# Patient Record
Sex: FEMALE | Race: WHITE | Employment: FULL TIME | URBAN - METROPOLITAN AREA
[De-identification: names, ages, dates, MRNs, and addresses within clinical notes are randomized per-mention and may not be internally consistent; named-entity substitution may affect disease eponyms.]

---
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
## 2018-01-16 ENCOUNTER — HOSPITAL ENCOUNTER (EMERGENCY)
Facility: CLINIC | Age: 18
Discharge: HOME | End: 2018-01-16
Attending: NURSE PRACTITIONER | Admitting: NURSE PRACTITIONER
Payer: COMMERCIAL

## 2018-01-16 VITALS
BODY MASS INDEX: 40.59 KG/M2 | HEIGHT: 61 IN | HEART RATE: 106 BPM | DIASTOLIC BLOOD PRESSURE: 73 MMHG | SYSTOLIC BLOOD PRESSURE: 114 MMHG | OXYGEN SATURATION: 97 % | RESPIRATION RATE: 18 BRPM | TEMPERATURE: 99 F | WEIGHT: 215 LBS

## 2018-01-16 DIAGNOSIS — J11.1 INFLUENZA-LIKE ILLNESS: Primary | ICD-10-CM

## 2018-01-16 PROCEDURE — G0463 HOSPITAL OUTPT CLINIC VISIT: HCPCS

## 2018-01-16 PROCEDURE — 99213 OFFICE O/P EST LOW 20 MIN: CPT | Performed by: NURSE PRACTITIONER

## 2018-01-16 RX ORDER — OSELTAMIVIR PHOSPHATE 75 MG/1
75 CAPSULE ORAL 2 TIMES DAILY
Qty: 10 CAPSULE | Refills: 0 | Status: SHIPPED | OUTPATIENT
Start: 2018-01-16 | End: 2018-01-21

## 2018-01-16 RX ORDER — ALBUTEROL SULFATE 90 UG/1
2 AEROSOL, METERED RESPIRATORY (INHALATION) EVERY 6 HOURS PRN
Qty: 1 INHALER | Refills: 0 | Status: SHIPPED | OUTPATIENT
Start: 2018-01-16 | End: 2018-07-31

## 2018-01-16 ASSESSMENT — ENCOUNTER SYMPTOMS
FATIGUE: 1
FEVER: 1
DIAPHORESIS: 1
DIARRHEA: 0
SORE THROAT: 1
VOMITING: 0
RHINORRHEA: 1
CHILLS: 1
ACTIVITY CHANGE: 1
NAUSEA: 0
TROUBLE SWALLOWING: 0
WHEEZING: 0
COUGH: 1
EYE PAIN: 0
HEADACHES: 1
SINUS PRESSURE: 0
SHORTNESS OF BREATH: 1
APPETITE CHANGE: 1
SINUS PAIN: 0
CONSTIPATION: 0

## 2018-01-16 NOTE — ED AVS SNAPSHOT
[facility] Emergency Department    [address] [facility] [address]    Phone:  [phone]    Fax:  [phone]                                       Cami Ware   MRN: [id number]    Department:  [facility] Emergency Department   Date of Visit:  1/16/2018           After Visit Summary Signature Page     I have received my discharge instructions, and my questions have been answered. I have discussed any challenges I see with this plan with the nurse or doctor.    ..........................................................................................................................................  Patient/Patient Representative Signature      ..........................................................................................................................................  Patient Representative Print Name and Relationship to Patient    ..................................................               ................................................  Date                                            Time    ..........................................................................................................................................  Reviewed by Signature/Title    ...................................................              ..............................................  Date                                                            Time

## 2018-01-16 NOTE — ED PROVIDER NOTES
History     Chief Complaint   Patient presents with     URI     cough  congestion    body aches  coughing constantly  wheezing        HPI    Cami Ware is a 17 year old female who presents to the ED with a 2 day history of sore throat, congestion, post nasal drip, sneezing, ear pressure, ear pain, sinus pressure, non productive cough, achiness and generalized body aches, fever of up to 103, chest tightness, shortness of breath, wheezing.  She has tried ibuprofen with improvement of symptoms.  She denies chest pain, decreased urine output, dental pain, diarrhea and vomiting.  She has been exposed to ill contacts at work. Predisposing factors include ill contact: Work, tobacco use and HX of asthma.      Problem List:    Patient Active Problem List    Diagnosis Date Noted     History of termination of pregnancy **DOES NOT WANT INFORMATION DISCLOSED TO ANYONE** 11/15/2017     Priority: Medium     Constipation, unspecified constipation type 08/30/2017     Priority: Medium     Migraine without aura and without status migrainosus, not intractable 11/17/2015     Priority: Medium     History of chicken pox 10/09/2015     Priority: Medium     Patient had chicken pox as a child          Past Medical History:    Past Medical History:   Diagnosis Date     Varicella without complication 1999       Past Surgical History:    No past surgical history on file.    Family History:    Family History   Problem Relation Age of Onset     Thyroid Disease Mother      Graves     Hypertension Mother      CANCER Mother 37     skin cancer      CANCER Brother      Wilm's Tumor, passed       Social History:  Marital Status:  Single [1]  Social History   Substance Use Topics     Smoking status: Current Every Day Smoker     Packs/day: 0.50     Smokeless tobacco: Never Used     Alcohol use No        Medications:      oseltamivir (TAMIFLU) 75 MG capsule   albuterol (PROAIR HFA/PROVENTIL HFA/VENTOLIN HFA) 108 (90 BASE) MCG/ACT Inhaler  "  SUMAtriptan (IMITREX) 100 MG tablet   SUMAtriptan (IMITREX) 100 MG tablet   levonorgestrel-ethinyl estradiol (AVIANE,ALESSE,LESSINA) 0.1-20 MG-MCG per tablet         Review of Systems   Constitutional: Positive for activity change, appetite change, chills, diaphoresis, fatigue and fever.   HENT: Positive for congestion, ear pain, rhinorrhea and sore throat. Negative for ear discharge, sinus pain, sinus pressure and trouble swallowing.    Eyes: Negative for pain.   Respiratory: Positive for cough and shortness of breath. Negative for wheezing.    Cardiovascular: Negative for chest pain.   Gastrointestinal: Negative for constipation, diarrhea, nausea and vomiting.   Skin: Negative for rash.   Neurological: Positive for headaches.   All other systems reviewed and are negative.      Physical Exam   BP: 114/73  Pulse: 106  Temp: 99  F (37.2  C)  Resp: 18  Height: 154.9 cm (5' 1\")  Weight: 97.5 kg (215 lb)  SpO2: 97 %      Physical Exam  GENERAL APPEARANCE ADULT: alert, oriented, appears ill, cooperative  EYES: PERRL, EOM normal, conjunctiva pink, sclera nonicteric, lids normal  HENT: Mouth and posterior oropharynx normal, moist mucous membranes, Ears and TMs normal , nose no nasal discharge or congestion, clear rhinorrhea, mucosal erythema, mucosal edema  NECK: few small anterior cervical nodes  RESP: lungs clear to auscultation bilaterally  CV: regular rate and rhythm, no murmur, rub or gallop  SKIN: no suspicious lesions or rashes  PSYCHE: mentation appears normal, affect and mood normal    ED Course     ED Course     Procedures    Labs Ordered and Resulted from Time of ED Arrival Up to the Time of Departure from the ED - No data to display    Assessments & Plan (with Medical Decision Making)     I have reviewed the nursing notes.    I have reviewed the findings, diagnosis, plan and need for follow up with the patient.  Medical Decision Making:  Upper respiratory infection symptoms with Abnormal - elevated HR vitals. "  DDx:  bronchitis, pneumonia, Viral URI (ie.. Influenza), sinusitis   CXR is not indicated.  Rapid Strep test is not indicated.     Assessment:  Influenza and Viral upper respiratory illness    Plan:   RX influenza  Tamiflu 75 mg bid x 5 days, albuterol inhaler prn    Reassurance given regarding lack of signs of serious infection.  Discussed home treatment with decongestants, cough suppressants and antipyretics.  Recommend follow up in primary care as needed, or sooner if symptoms persist. Return to the ED with fever, trouble swallowing or breathing, or any other concerns.     Condition on disposition: Stable      Discharge Medication List as of 1/16/2018  5:10 PM      START taking these medications    Details   oseltamivir (TAMIFLU) 75 MG capsule Take 1 capsule (75 mg) by mouth 2 times daily for 5 days, Disp-10 capsule, R-0, E-Prescribe      albuterol (PROAIR HFA/PROVENTIL HFA/VENTOLIN HFA) 108 (90 BASE) MCG/ACT Inhaler Inhale 2 puffs into the lungs every 6 hours as needed for shortness of breath / dyspnea or wheezing, Disp-1 Inhaler, R-0, E-Prescribe             Final diagnoses:   Influenza-like illness       1/16/2018   [facility] EMERGENCY DEPARTMENT     Pamela Ferguson, JESÚS CNP  01/16/18 2710

## 2018-01-16 NOTE — LETTER
[facility] EMERGENCY DEPARTMENT  [address] [facility] [address]  Phone: [phone]  Fax: [phone]    January 16, 2018        Cami Ware  [address] [place] [address]   [facility] [address]          To whom it may concern:    RE: Cami Ware    Patient was seen and treated today at our clinic.  Patient may return to work 01/18/2018 with the following:  No working or lifting restrictions    Please contact me for questions or concerns.      Sincerely,        Pamela MCKEON, CNM, FNP, DNP

## 2018-01-16 NOTE — ED AVS SNAPSHOT
[facility] Emergency Department    [address] [facility] [address]    Phone:  [phone]    Fax:  [phone]                                       Cami Ware   MRN: [id number]    Department:  [facility] Emergency Department   Date of Visit:  1/16/2018           Patient Information     Date Of Birth          2000        Your diagnoses for this visit were:     Influenza-like illness        You were seen by Pamela Ferguson APRN CNP.      Follow-up Information     Follow up with No Ref-Primary, Physician In 1 week.    Why:  As needed, If symptoms worsen      Discharge References/Attachments     (ADULT), INFLUENZA (ENGLISH)      24 Hour Appointment Hotline       To make an appointment at any [facility] clinic, call [phone] ([phone]). If you don't have a family doctor or clinic, we will help you find one. [facility] clinics are conveniently located to serve the needs of you and your family.             Review of your medicines      START taking        Dose / Directions Last dose taken    albuterol 108 (90 BASE) MCG/ACT Inhaler   Commonly known as:  PROAIR HFA/PROVENTIL HFA/VENTOLIN HFA   Dose:  2 puff   Quantity:  1 Inhaler        Inhale 2 puffs into the lungs every 6 hours as needed for shortness of breath / dyspnea or wheezing   Refills:  0        oseltamivir 75 MG capsule   Commonly known as:  TAMIFLU   Dose:  75 mg   Quantity:  10 capsule        Take 1 capsule (75 mg) by mouth 2 times daily for 5 days   Refills:  0          Our records show that you are taking the medicines listed below. If these are incorrect, please call your family doctor or clinic.        Dose / Directions Last dose taken    levonorgestrel-ethinyl estradiol 0.1-20 MG-MCG per tablet   Commonly known as:  AVIANE,ALESSE,LESSINA   Dose:  1 tablet   Quantity:  84 tablet        Take 1 tablet by mouth daily   Refills:  3        * SUMAtriptan 100 MG tablet   Commonly known as:  IMITREX   Dose:  100 mg    Quantity:  12 tablet        Take 1 tablet (100 mg) by mouth at onset of headache for migraine May repeat in 2 hours. Max 2 tablets/24 hours.   Refills:  6        * SUMAtriptan 100 MG tablet   Commonly known as:  IMITREX   Quantity:  12 tablet        TAKE 1 TABLET(100 MG) BY MOUTH AT ONSET OF HEADACHE FOR MIGRAINE. MAY REPEAT IN 2 HOURS. MAX 2 TABLETS/ 24 HOURS   Refills:  0        * Notice:  This list has 2 medication(s) that are the same as other medications prescribed for you. Read the directions carefully, and ask your doctor or other care provider to review them with you.            Prescriptions were sent or printed at these locations (2 Prescriptions)                   [facility] - [place], [place] - [address] [facility] AT [facility] OF [address]   [address] [facility] [address]    Telephone:  [phone]   Fax:  [phone]   Hours:                  E-Prescribed (2 of 2)         oseltamivir (TAMIFLU) 75 MG capsule               albuterol (PROAIR HFA/PROVENTIL HFA/VENTOLIN HFA) 108 (90 BASE) MCG/ACT Inhaler                Orders Needing Specimen Collection     None      Pending Results     No orders found from 1/14/2018 to 1/17/2018.            Pending Culture Results     No orders found from 1/14/2018 to 1/17/2018.            Pending Results Instructions     If you had any lab results that were not finalized at the time of your Discharge, you can call the ED Lab Result RN at [phone]. You will be contacted by this team for any positive Lab results or changes in treatment. The nurses are available 7 days a week from 10A to 6:30P.  You can leave a message 24 hours per day and they will return your call.        Test Results From Your Hospital Stay               Thank you for choosing [facility]       Thank you for choosing [facility] for your care. Our goal is always to provide you with excellent care. Hearing back from our patients is one way we can continue to improve our  services. Please take a few minutes to complete the written survey that you may receive in the mail after you visit with us. Thank you!        [email or web address] Information     [employer] gives you secure access to your electronic health record. If you see a primary care provider, you can also send messages to your care team and make appointments. If you have questions, please call your primary care clinic.  If you do not have a primary care provider, please call [phone] and they will assist you.        Care EveryWhere ID     This is your Care EveryWhere ID. This could be used by other organizations to access your [place] medical records  [id number]        Equal Access to Services     YOVANY CH : Michelle Collins, angie baer, aneudy aguillon. So [facility] [phone].    ATENCIÓN: Si habla español, tiene a mcgee disposición servicios gratuitos de asistencia lingüística. Llame al [phone].    We comply with applicable federal civil rights laws and [place] laws. We do not discriminate on the basis of race, color, national origin, age, disability, sex, sexual orientation, or gender identity.            After Visit Summary       This is your record. Keep this with you and show to your community pharmacist(s) and doctor(s) at your next visit.

## 2018-02-01 DIAGNOSIS — G43.009 MIGRAINE WITHOUT AURA AND WITHOUT STATUS MIGRAINOSUS, NOT INTRACTABLE: ICD-10-CM

## 2018-02-01 NOTE — TELEPHONE ENCOUNTER
Requested Prescriptions   Pending Prescriptions Disp Refills     SUMAtriptan (IMITREX) 100 MG tablet [Pharmacy Med Name: SUMATRIPTAN  Last Written Prescription Date:  12/12/17  Last Fill Quantity: 12,  # refills: 0   Last Office Visit with [facility] provider:  10/27/17   Future Office Visit:      100MG TABLETS] 12 tablet 0     Sig: TAKE 1 TABLET(100 MG) BY MOUTH AT ONSET OF HEADACHE FOR MIGRAINE. MAY REPEAT IN 2 HOURS. MAX 2 TABLETS/ 24 HOURS    There is no refill protocol information for this order

## 2018-02-02 RX ORDER — SUMATRIPTAN 100 MG/1
TABLET, FILM COATED ORAL
Qty: 12 TABLET | Refills: 0 | Status: SHIPPED | OUTPATIENT
Start: 2018-02-02 | End: 2018-08-08

## 2018-02-02 NOTE — TELEPHONE ENCOUNTER
Requested Prescriptions   Pending Prescriptions Disp Refills     SUMAtriptan (IMITREX) 100 MG tablet [Pharmacy Med Name: SUMATRIPTAN 100MG TABLETS] 12 tablet 0     Sig: TAKE 1 TABLET(100 MG) BY MOUTH AT ONSET OF HEADACHE FOR MIGRAINE. MAY REPEAT IN 2 HOURS. MAX 2 TABLETS/ 24 HOURS    There is no refill protocol information for this order        Routing refill request to provider for review/approval because:  Drug not on the [facility] refill protocol     Silvia ELLIS Rn

## 2018-07-31 ENCOUNTER — OFFICE VISIT (OUTPATIENT)
Dept: FAMILY MEDICINE | Facility: CLINIC | Age: 18
End: 2018-07-31
Payer: COMMERCIAL

## 2018-07-31 VITALS
WEIGHT: 219.2 LBS | DIASTOLIC BLOOD PRESSURE: 60 MMHG | HEIGHT: 61 IN | BODY MASS INDEX: 41.39 KG/M2 | TEMPERATURE: 98.8 F | SYSTOLIC BLOOD PRESSURE: 102 MMHG | HEART RATE: 92 BPM

## 2018-07-31 DIAGNOSIS — N91.2 AMENORRHEA: ICD-10-CM

## 2018-07-31 DIAGNOSIS — Z32.01 PREGNANCY TEST POSITIVE: Primary | ICD-10-CM

## 2018-07-31 LAB — BETA HCG QUAL IFA URINE: POSITIVE

## 2018-07-31 PROCEDURE — 84703 CHORIONIC GONADOTROPIN ASSAY: CPT | Performed by: NURSE PRACTITIONER

## 2018-07-31 PROCEDURE — 99213 OFFICE O/P EST LOW 20 MIN: CPT | Performed by: NURSE PRACTITIONER

## 2018-07-31 NOTE — PROGRESS NOTES
"  SUBJECTIVE:   Cami Ware is a 17 year old female who presents to clinic today for the following health issues:      Concern - Confirmation of Pregnancy  Onset: LMP:2018,   Took 2 home pregnancy tests yesterday and the day before and both were Positive   Wasn't preventing and wasn't planning a pregnancy.  Happy about being pregnant.  Father of the baby here with her today and is very excited  She has been taking a pre- vitamin daily.  Nonsmoker.  No alcohol.  Drinking a cup of coffee and energy drinks daily.  H/o prior pregnancy - termination          Problem list and histories reviewed & adjusted, as indicated.  Additional history: as documented      Reviewed and updated as needed this visit by clinical staff  Tobacco  Allergies  Meds  Med Hx  Surg Hx  Fam Hx  Soc Hx      Reviewed and updated as needed this visit by Provider         ROS:  Constitutional, HEENT, cardiovascular, pulmonary, gi and gu systems are negative, except as otherwise noted.    OBJECTIVE:     /60 (BP Location: Right arm, Patient Position: Chair, Cuff Size: Adult Large)  Pulse 92  Temp 98.8  F (37.1  C) (Tympanic)  Ht 5' 1\" (1.549 m)  Wt 219 lb 3.2 oz (99.4 kg)  LMP 2018  BMI 41.42 kg/m2  Body mass index is 41.42 kg/(m^2).  GENERAL: healthy, alert and no distress  PSYCH: mentation appears normal, affect normal/bright    Diagnostic Test Results:  Results for orders placed or performed in visit on 18 (from the past 24 hour(s))   Beta HCG Qual, Urine - [facility] and [place] ([id number])   Result Value Ref Range    Beta HCG Qual IFA Urine Positive (A) NEG^Negative          ASSESSMENT/PLAN:       ICD-10-CM    1. Pregnancy test positive Z32.01    2. Amenorrhea N91.2 Beta HCG Qual, Urine - [facility] [place] ([id number])     Pregnancy confirmed.  Continue daily pre-chris vitamin.  Discussed avoidance of caffeine and alcohol.  Discussed exercise and healthy diet.    Information given to OB clinic.      The risks, " benefits and treatment options of prescribed medications or other treatments have been discussed with the patient. The patient verbalized their understanding and should call or follow up if no improvement or if they develop further problems.    JESÚS Arana [facility]

## 2018-07-31 NOTE — PATIENT INSTRUCTIONS
Thank you for choosing [facility].  You may be receiving a survey in the mail from Chad Temple regarding your visit today.  Please take a few minutes to complete and return the survey to let us know how we are doing.      If you have questions or concerns, please contact us via Reonomy or you can contact your care team at [phone].    Our Clinic hours are:  Monday 6:40 am  to 7:00 pm  Tuesday -Friday 6:40 am to 5:00 pm    The [place] outpatient lab hours are:  Monday - Friday 6:10 am to 4:45 pm  Saturdays 7:00 am to 11:00 am  Appointments are required, call [phone]    If you have clinical questions after hours or would like to schedule an appointment,  call the clinic at [phone].

## 2018-07-31 NOTE — MR AVS SNAPSHOT
After Visit Summary   7/31/2018    Cami Ware    MRN: [id number]           Patient Information     Date Of Birth          2000        Visit Information        Provider Department      7/31/2018 9:20 AM Jihan Jacobsen APRN CNP [facility]        Today's Diagnoses     Pregnancy test positive    -  1    Amenorrhea          Care Instructions          Thank you for choosing [facility].  You may be receiving a survey in the mail from [employer] and [employer] regarding your visit today.  Please take a few minutes to complete and return the survey to let us know how we are doing.      If you have questions or concerns, please contact us via Molecular Templates or you can contact your care team at [phone].    Our Clinic hours are:  Monday 6:40 am  to 7:00 pm  Tuesday -Friday 6:40 am to 5:00 pm    The [place] outpatient lab hours are:  Monday - Friday 6:10 am to 4:45 pm  Saturdays 7:00 am to 11:00 am  Appointments are required, call [phone]    If you have clinical questions after hours or would like to schedule an appointment,  call the clinic at [phone].            Follow-ups after your visit        Who to contact     If you have questions or need follow up information about today's clinic visit or your schedule please contact [facility] directly at [phone].  Normal or non-critical lab and imaging results will be communicated to you by StartForcehart, letter or phone within 4 business days after the clinic has received the results. If you do not hear from us within 7 days, please contact the clinic through StartForcehart or phone. If you have a critical or abnormal lab result, we will notify you by phone as soon as possible.  Submit refill requests through Molecular Templates or call your pharmacy and they will forward the refill request to us. Please allow 3 business days for your refill to be completed.          Additional Information About Your Visit        MyChart Information   "   SocialMeterTV gives you secure access to your electronic health record. If you see a primary care provider, you can also send messages to your care team and make appointments. If you have questions, please call your primary care clinic.  If you do not have a primary care provider, please call [phone] and they will assist you.        Care EveryWhere ID     This is your Care EveryWhere ID. This could be used by other organizations to access your [place] medical records  [id number]        Your Vitals Were     Pulse Temperature Height Last Period BMI (Body Mass Index)       92 98.8  F (37.1  C) (Tympanic) 5' 1\" (1.549 m) 06/30/2018 41.42 kg/m2        Blood Pressure from Last 3 Encounters:   07/31/18 102/60   01/16/18 114/73   11/15/17 117/74    Weight from Last 3 Encounters:   07/31/18 219 lb 3.2 oz (99.4 kg) (99 %)*   01/16/18 215 lb (97.5 kg) (98 %)*   11/15/17 208 lb (94.3 kg) (98 %)*     * Growth percentiles are based on CDC 2-20 Years data.              We Performed the Following     Beta HCG Qual, Urine - [facility] [place] ([id number])        Primary Care Provider Fax #    Physician No Ref-Primary [phone]       No address on file        Equal Access to Services     YOVANY CH : Hadii asha riceo Sojazmyne, waaxda luqadaha, qaybta kaalmaanum whaley, aneudy thomas. So [facility] [phone].    ATENCIÓN: Si habla español, tiene a mcgee disposición servicios gratuitos de asistencia lingüística. Llame al [phone].    We comply with applicable federal civil rights laws and [place] laws. We do not discriminate on the basis of race, color, national origin, age, disability, sex, sexual orientation, or gender identity.            Thank you!     Thank you for choosing [facility]  for your care. Our goal is always to provide you with excellent care. Hearing back from our patients is one way we can continue to improve our services. Please take a few minutes to complete the " written survey that you may receive in the mail after your visit with us. Thank you!             Your Updated Medication List - Protect others around you: Learn how to safely use, store and throw away your medicines at www.disposemymeds.org.          This list is accurate as of 7/31/18 10:21 AM.  Always use your most recent med list.                   Brand Name Dispense Instructions for use Diagnosis    SUMAtriptan 100 MG tablet    IMITREX    12 tablet    TAKE 1 TABLET(100 MG) BY MOUTH AT ONSET OF HEADACHE FOR MIGRAINE. MAY REPEAT IN 2 HOURS. MAX 2 TABLETS/ 24 HOURS    Migraine without aura and without status migrainosus, not intractable

## 2018-08-08 ENCOUNTER — APPOINTMENT (OUTPATIENT)
Dept: OBGYN | Facility: CLINIC | Age: 18
End: 2018-08-08
Payer: COMMERCIAL

## 2018-08-08 ENCOUNTER — PRENATAL OFFICE VISIT (OUTPATIENT)
Dept: OBGYN | Facility: CLINIC | Age: 18
End: 2018-08-08

## 2018-08-08 DIAGNOSIS — Z34.00 PRENATAL CARE, FIRST PREGNANCY: Primary | ICD-10-CM

## 2018-08-08 PROCEDURE — 99207 ZZC NO CHARGE NURSE ONLY: CPT | Performed by: OBSTETRICS & GYNECOLOGY

## 2018-08-08 RX ORDER — PRENATAL VIT/IRON FUM/FOLIC AC 27MG-0.8MG
1 TABLET ORAL DAILY
COMMUNITY

## 2018-08-08 NOTE — MR AVS SNAPSHOT
After Visit Summary   8/8/2018    Cami Ware    MRN: [id number]           Patient Information     Date Of Birth          2000        Visit Information        Provider Department      8/8/2018 6:31 PM Vanna Conde MD [facility]        Today's Diagnoses     Prenatal care, first pregnancy    -  1       Follow-ups after your visit        Your next 10 appointments already scheduled     Aug 21, 2018  1:00 PM CDT   New Prenatal with Vanna Conde MD, [facility] 2   [facility]    [address] [address]   [phone]              Who to contact     If you have questions or need follow up information about today's clinic visit or your schedule please contact [facility] directly at [phone].  Normal or non-critical lab and imaging results will be communicated to you by MyChart, letter or phone within 4 business days after the clinic has received the results. If you do not hear from us within 7 days, please contact the clinic through MyChart or phone. If you have a critical or abnormal lab result, we will notify you by phone as soon as possible.  Submit refill requests through HelioVolt or call your pharmacy and they will forward the refill request to us. Please allow 3 business days for your refill to be completed.          Additional Information About Your Visit        MyChart Information     HelioVolt gives you secure access to your electronic health record. If you see a primary care provider, you can also send messages to your care team and make appointments. If you have questions, please call your primary care clinic.  If you do not have a primary care provider, please call [phone] and they will assist you.        Care EveryWhere ID     This is your Care EveryWhere ID. This could be used by other organizations to access your [place] medical records  [id number]        Your Vitals  Were     Last Period                   06/30/2018            Blood Pressure from Last 3 Encounters:   07/31/18 102/60   01/16/18 114/73   11/15/17 117/74    Weight from Last 3 Encounters:   07/31/18 99.4 kg (219 lb 3.2 oz) (99 %)*   01/16/18 97.5 kg (215 lb) (98 %)*   11/15/17 94.3 kg (208 lb) (98 %)*     * Growth percentiles are based on [facility] 2-20 Years data.              Today, you had the following     No orders found for display       Primary Care Provider Fax #    Physician No Ref-Primary [phone]       No address on file        Equal Access to Services     YOVANY CH : Michelle Collins, angie baer, sandra whaley, aneudy wagner . So [facility] [phone].    ATENCIÓN: Si habla español, tiene a mcgee disposición servicios gratuitos de asistencia lingüística. Llame al [phone].    We comply with applicable federal civil rights laws and [place] laws. We do not discriminate on the basis of race, color, national origin, age, disability, sex, sexual orientation, or gender identity.            Thank you!     Thank you for choosing [facility]  for your care. Our goal is always to provide you with excellent care. Hearing back from our patients is one way we can continue to improve our services. Please take a few minutes to complete the written survey that you may receive in the mail after your visit with us. Thank you!             Your Updated Medication List - Protect others around you: Learn how to safely use, store and throw away your medicines at www.disposemymeds.org.          This list is accurate as of 8/8/18  6:53 PM.  Always use your most recent med list.                   Brand Name Dispense Instructions for use Diagnosis    prenatal multivitamin plus iron 27-0.8 MG Tabs per tablet      Take 1 tablet by mouth daily

## 2018-08-21 ENCOUNTER — PRENATAL OFFICE VISIT (OUTPATIENT)
Dept: OBGYN | Facility: CLINIC | Age: 18
End: 2018-08-21
Payer: COMMERCIAL

## 2018-08-21 VITALS
BODY MASS INDEX: 42.03 KG/M2 | WEIGHT: 222.6 LBS | RESPIRATION RATE: 18 BRPM | HEIGHT: 61 IN | TEMPERATURE: 98.5 F | HEART RATE: 92 BPM | SYSTOLIC BLOOD PRESSURE: 122 MMHG | DIASTOLIC BLOOD PRESSURE: 78 MMHG

## 2018-08-21 DIAGNOSIS — Z34.01 ENCOUNTER FOR PRENATAL CARE IN FIRST TRIMESTER OF FIRST PREGNANCY: Primary | ICD-10-CM

## 2018-08-21 LAB
ALBUMIN UR-MCNC: NEGATIVE MG/DL
APPEARANCE UR: CLEAR
BACTERIA #/AREA URNS HPF: ABNORMAL /HPF
BILIRUB UR QL STRIP: NEGATIVE
COLOR UR AUTO: YELLOW
ERYTHROCYTE [DISTWIDTH] IN BLOOD BY AUTOMATED COUNT: 14.1 % (ref 10–15)
GLUCOSE UR STRIP-MCNC: NEGATIVE MG/DL
HCT VFR BLD AUTO: 35.5 % (ref 35–47)
HGB BLD-MCNC: 11.9 G/DL (ref 11.7–15.7)
HGB UR QL STRIP: NEGATIVE
KETONES UR STRIP-MCNC: ABNORMAL MG/DL
LEUKOCYTE ESTERASE UR QL STRIP: ABNORMAL
MCH RBC QN AUTO: 27.4 PG (ref 26.5–33)
MCHC RBC AUTO-ENTMCNC: 33.5 G/DL (ref 31.5–36.5)
MCV RBC AUTO: 82 FL (ref 78–100)
NITRATE UR QL: NEGATIVE
NON-SQ EPI CELLS #/AREA URNS LPF: ABNORMAL /LPF
PH UR STRIP: 6 PH (ref 5–7)
PLATELET # BLD AUTO: 270 10E9/L (ref 150–450)
RBC # BLD AUTO: 4.34 10E12/L (ref 3.8–5.2)
RBC #/AREA URNS AUTO: ABNORMAL /HPF
SOURCE: ABNORMAL
SP GR UR STRIP: 1.02 (ref 1–1.03)
UROBILINOGEN UR STRIP-ACNC: 0.2 EU/DL (ref 0.2–1)
WBC # BLD AUTO: 10.5 10E9/L (ref 4–11)
WBC #/AREA URNS AUTO: ABNORMAL /HPF

## 2018-08-21 PROCEDURE — 86762 RUBELLA ANTIBODY: CPT | Performed by: OBSTETRICS & GYNECOLOGY

## 2018-08-21 PROCEDURE — 86780 TREPONEMA PALLIDUM: CPT | Performed by: OBSTETRICS & GYNECOLOGY

## 2018-08-21 PROCEDURE — 81001 URINALYSIS AUTO W/SCOPE: CPT | Performed by: OBSTETRICS & GYNECOLOGY

## 2018-08-21 PROCEDURE — 36415 COLL VENOUS BLD VENIPUNCTURE: CPT | Performed by: OBSTETRICS & GYNECOLOGY

## 2018-08-21 PROCEDURE — 86900 BLOOD TYPING SEROLOGIC ABO: CPT | Performed by: OBSTETRICS & GYNECOLOGY

## 2018-08-21 PROCEDURE — 87389 HIV-1 AG W/HIV-1&-2 AB AG IA: CPT | Performed by: OBSTETRICS & GYNECOLOGY

## 2018-08-21 PROCEDURE — 87340 HEPATITIS B SURFACE AG IA: CPT | Performed by: OBSTETRICS & GYNECOLOGY

## 2018-08-21 PROCEDURE — 76817 TRANSVAGINAL US OBSTETRIC: CPT | Performed by: OBSTETRICS & GYNECOLOGY

## 2018-08-21 PROCEDURE — 87591 N.GONORRHOEAE DNA AMP PROB: CPT | Performed by: OBSTETRICS & GYNECOLOGY

## 2018-08-21 PROCEDURE — 87491 CHLMYD TRACH DNA AMP PROBE: CPT | Performed by: OBSTETRICS & GYNECOLOGY

## 2018-08-21 PROCEDURE — 99207 ZZC FIRST OB VISIT: CPT | Performed by: OBSTETRICS & GYNECOLOGY

## 2018-08-21 PROCEDURE — 85027 COMPLETE CBC AUTOMATED: CPT | Performed by: OBSTETRICS & GYNECOLOGY

## 2018-08-21 PROCEDURE — 86901 BLOOD TYPING SEROLOGIC RH(D): CPT | Performed by: OBSTETRICS & GYNECOLOGY

## 2018-08-21 PROCEDURE — 86850 RBC ANTIBODY SCREEN: CPT | Performed by: OBSTETRICS & GYNECOLOGY

## 2018-08-21 PROCEDURE — 87086 URINE CULTURE/COLONY COUNT: CPT | Performed by: OBSTETRICS & GYNECOLOGY

## 2018-08-21 NOTE — NURSING NOTE
"Initial /78 (BP Location: Right arm, Patient Position: Right side, Cuff Size: Adult Large)  Pulse 92  Temp 98.5  F (36.9  C) (Tympanic)  Resp 18  Ht 5' 1\" (1.549 m)  Wt 222 lb 9.6 oz (101 kg)  LMP 06/30/2018  Breastfeeding? No  BMI 42.06 kg/m2 Estimated body mass index is 42.06 kg/(m^2) as calculated from the following:    Height as of this encounter: 5' 1\" (1.549 m).    Weight as of this encounter: 222 lb 9.6 oz (101 kg). .    Yanci Monreal, MANDI    "

## 2018-08-21 NOTE — MR AVS SNAPSHOT
After Visit Summary   8/21/2018    Cami Ware    MRN: [id number]           Patient Information     Date Of Birth          2000        Visit Information        Provider Department      8/21/2018 1:00 PM Vanna Conde MD; [facility] 2 [facility]        Today's Diagnoses     Encounter for prenatal care in first trimester of first pregnancy    -  1       Follow-ups after your visit        Follow-up notes from your care team     Return in about 4 weeks (around 9/18/2018).      Your next 10 appointments already scheduled     Sep 18, 2018  8:30 AM CDT   ESTABLISHED PRENATAL with Vanna Conde MD   [facility]    [address] [address] [phone]              Who to contact     If you have questions or need follow up information about today's clinic visit or your schedule please contact [facility] directly at [phone].  Normal or non-critical lab and imaging results will be communicated to you by IActionablehart, letter or phone within 4 business days after the clinic has received the results. If you do not hear from us within 7 days, please contact the clinic through Kidizent or phone. If you have a critical or abnormal lab result, we will notify you by phone as soon as possible.  Submit refill requests through [employer] or call your pharmacy and they will forward the refill request to us. Please allow 3 business days for your refill to be completed.          Additional Information About Your Visit        MyChart Information     [employer] gives you secure access to your electronic health record. If you see a primary care provider, you can also send messages to your care team and make appointments. If you have questions, please call your primary care clinic.  If you do not have a primary care provider, please call [phone] and they will assist you.        Care EveryWhere ID     This is your  "Care EveryWhere ID. This could be used by other organizations to access your [place] medical records  [id number]        Your Vitals Were     Pulse Temperature Respirations Height Last Period Breastfeeding?    92 98.5  F (36.9  C) (Tympanic) 18 5' 1\" (1.549 m) 06/30/2018 No    BMI (Body Mass Index)                   42.06 kg/m2            Blood Pressure from Last 3 Encounters:   08/21/18 122/78   07/31/18 102/60   01/16/18 114/73    Weight from Last 3 Encounters:   08/21/18 222 lb 9.6 oz (101 kg) (99 %)*   07/31/18 219 lb 3.2 oz (99.4 kg) (99 %)*   01/16/18 215 lb (97.5 kg) (98 %)*     * Growth percentiles are based on [facility] 2-20 Years data.              We Performed the Following     *UA reflex to Microscopic     ABO/Rh type and screen     CBC with platelets     Chlamydia trachomatis PCR     Hepatitis B surface antigen     HIV Antigen Antibody Combo     Neisseria gonorrhoeae PCR     Rubella Antibody IgG Quantitative     Treponema Abs w Reflex to RPR and Titer     Urine Culture Aerobic Bacterial     Urine Microscopic     US OB <14 Weeks w Transvaginal Single        Primary Care Provider Fax #    Physician No Ref-Primary [phone]       No address on file        Equal Access to Services     YOVANY CH : Hadii asha riceo Sojazmyne, waaxda luqadaha, qaybta kaalmada adeegyada, aneudy thomas. So [facility] [phone].    ATENCIÓN: Si habla español, tiene a mcgee disposición servicios gratuitos de asistencia lingüística. Llame al [phone].    We comply with applicable federal civil rights laws and [place] laws. We do not discriminate on the basis of race, color, national origin, age, disability, sex, sexual orientation, or gender identity.            Thank you!     Thank you for choosing [facility]  for your care. Our goal is always to provide you with excellent care. Hearing back from our patients is one way we can continue to improve our services. Please take a few " minutes to complete the written survey that you may receive in the mail after your visit with us. Thank you!             Your Updated Medication List - Protect others around you: Learn how to safely use, store and throw away your medicines at www.disposemymeds.org.          This list is accurate as of 8/21/18 11:59 PM.  Always use your most recent med list.                   Brand Name Dispense Instructions for use Diagnosis    prenatal multivitamin plus iron 27-0.8 MG Tabs per tablet      Take 1 tablet by mouth daily

## 2018-08-22 LAB
ABO + RH BLD: NORMAL
ABO + RH BLD: NORMAL
BACTERIA SPEC CULT: NORMAL
BLD GP AB SCN SERPL QL: NORMAL
BLOOD BANK CMNT PATIENT-IMP: NORMAL
C TRACH DNA SPEC QL NAA+PROBE: NEGATIVE
HBV SURFACE AG SERPL QL IA: NONREACTIVE
HIV 1+2 AB+HIV1 P24 AG SERPL QL IA: NONREACTIVE
Lab: NORMAL
N GONORRHOEA DNA SPEC QL NAA+PROBE: NEGATIVE
RUBV IGG SERPL IA-ACNC: 68 IU/ML
SPECIMEN EXP DATE BLD: NORMAL
SPECIMEN SOURCE: NORMAL
T PALLIDUM AB SER QL: NONREACTIVE

## 2018-08-23 NOTE — PROGRESS NOTES
"Cami is a 17 year old  at 7w3d by LMP here for new ob visit.      H/o TAB x1 earlier this year.  This pregnancy was not exactly planned but they are planning to keep it.  PAUL Cosby accompanies her today.  Feeling fatigued and nauseated, vomits about 1x/day.  occ cramping  No bleeding.  Asthma as a child, no current usage of inhalers.    Obstetric History       T0      L0     SAB0   TAB0   Ectopic0   Multiple0   Live Births0       # Outcome Date GA Lbr Vladimir/2nd Weight Sex Delivery Anes PTL Lv   2 Current            1 AB 18 4w0d    TAB             ROS: Ten point review of systems was reviewed and negative except the above.    Past Medical History:   Diagnosis Date     Asthma     as child and teenager     Varicella without complication     Chickenpox     Past Surgical History:   Procedure Laterality Date     MOUTH SURGERY      wisdom teeth     Patient Active Problem List    Diagnosis Date Noted     Prenatal care, first pregnancy 2018     Priority: Medium     PAUL- Nestor Miller       History of termination of pregnancy **DOES NOT WANT INFORMATION DISCLOSED TO ANYONE** 11/15/2017     Priority: Medium     Constipation, unspecified constipation type 2017     Priority: Medium     Migraine without aura and without status migrainosus, not intractable 2015     Priority: Medium     History of chicken pox 10/09/2015     Priority: Medium     Patient had chicken pox as a child        No Known Allergies    Current Outpatient Prescriptions on File Prior to Visit:  Prenatal Vit-Fe Fumarate-FA (PRENATAL MULTIVITAMIN PLUS IRON) 27-0.8 MG TABS per tablet Take 1 tablet by mouth daily     No current facility-administered medications on file prior to visit.     Physical Exam:   /78 (BP Location: Right arm, Patient Position: Right side, Cuff Size: Adult Large)  Pulse 92  Temp 98.5  F (36.9  C) (Tympanic)  Resp 18  Ht 5' 1\" (1.549 m)  Wt 222 lb 9.6 oz (101 kg)  LMP 2018  " Breastfeeding? No  BMI 42.06 kg/m2    Gen:  no acute distress, comfortable, obese  HENT: No scleral injection or icterus  CV: Regular rate and rhythm, no m/g/r  Resp: Normal work of breathing, no cough  GI: Abdomen soft, non-tender. No masses, organomegaly  Skin: No suspicious lesions or rashes  Psychiatric: mentation appears normal    Cervix: Nulliparous, closed, mobile,  no discharge  Uterus: 7 weeks, Normal shape, position and consistency   Adnexa: Normal  Bony Pelvis: Adequate     Dating ultrasound 2018:  Impression:  Faustin intrauterine pregnancy at 7w2d with EUGENIO 2019  Consistent with LMP dating at 7w3d with EUGENIO 2019   CRL 1.13 cm   bpm    A/P 17 year old  at 7w3d dated by LMP c/w 7w3d US here for NOB visit.  - Discussed physician coverage, tertiary support, diet, exercise, weight gain, schedule of visits, routine and indicated ultrasounds, and childbirth education.   - Options for  testing for chromosomal and birth defects were discussed with the patient including nuchal lucency/blood marker testing in the first trimester and quad screening and/or Level 2 ultrasound in the second trimester. We discussed that these are screening tests and not diagnostic tests and that false positives and negatives are a distinct possibility. We discussed that follow up diagnostic testing would include chorionic villus sampling or amniocentesis depending on gestational age.  - NOB labs ordered  - recommend PNV  - she strongly desires Mirena IUD to be placed immediately postpartum.  Initially she was thinking at the time of the delivery, but revised her request later saying that 2-4 weeks later should be sufficient, but she really doesn't desire another unplanned pregnancy.    - obesity - no other risk factor for GDM, but would still consider early GCT.  Pt doesn't have time for this today.      RTC 4 weeks    Vanna Conde MD, MPH  [facility]

## 2018-09-07 ENCOUNTER — APPOINTMENT (OUTPATIENT)
Dept: ULTRASOUND IMAGING | Facility: CLINIC | Age: 18
End: 2018-09-07
Attending: EMERGENCY MEDICINE
Payer: COMMERCIAL

## 2018-09-07 ENCOUNTER — TELEPHONE (OUTPATIENT)
Dept: OBGYN | Facility: CLINIC | Age: 18
End: 2018-09-07

## 2018-09-07 ENCOUNTER — HOSPITAL ENCOUNTER (EMERGENCY)
Facility: CLINIC | Age: 18
Discharge: HOME | End: 2018-09-07
Attending: EMERGENCY MEDICINE | Admitting: EMERGENCY MEDICINE
Payer: COMMERCIAL

## 2018-09-07 VITALS
SYSTOLIC BLOOD PRESSURE: 119 MMHG | OXYGEN SATURATION: 98 % | DIASTOLIC BLOOD PRESSURE: 88 MMHG | RESPIRATION RATE: 16 BRPM | TEMPERATURE: 98.1 F | BODY MASS INDEX: 41.35 KG/M2 | HEIGHT: 61 IN | WEIGHT: 219 LBS

## 2018-09-07 DIAGNOSIS — M54.50 ACUTE BILATERAL LOW BACK PAIN WITHOUT SCIATICA: ICD-10-CM

## 2018-09-07 DIAGNOSIS — R10.30 LOWER ABDOMINAL PAIN: ICD-10-CM

## 2018-09-07 DIAGNOSIS — Z3A.10 10 WEEKS GESTATION OF PREGNANCY: ICD-10-CM

## 2018-09-07 LAB
ALBUMIN UR-MCNC: NEGATIVE MG/DL
ANION GAP SERPL CALCULATED.3IONS-SCNC: 6 MMOL/L (ref 3–14)
APPEARANCE UR: CLEAR
BASOPHILS # BLD AUTO: 0 10E9/L (ref 0–0.2)
BASOPHILS NFR BLD AUTO: 0.3 %
BILIRUB UR QL STRIP: NEGATIVE
BUN SERPL-MCNC: 7 MG/DL (ref 7–30)
CALCIUM SERPL-MCNC: 8.7 MG/DL (ref 8.5–10.1)
CHLORIDE SERPL-SCNC: 107 MMOL/L (ref 96–110)
CO2 SERPL-SCNC: 23 MMOL/L (ref 20–32)
COLOR UR AUTO: YELLOW
CREAT SERPL-MCNC: 0.63 MG/DL (ref 0.5–1)
DIFFERENTIAL METHOD BLD: ABNORMAL
EOSINOPHIL # BLD AUTO: 0.1 10E9/L (ref 0–0.7)
EOSINOPHIL NFR BLD AUTO: 1 %
ERYTHROCYTE [DISTWIDTH] IN BLOOD BY AUTOMATED COUNT: 13.4 % (ref 10–15)
GFR SERPL CREATININE-BSD FRML MDRD: >90 ML/MIN/1.7M2
GLUCOSE SERPL-MCNC: 87 MG/DL (ref 70–99)
GLUCOSE UR STRIP-MCNC: NEGATIVE MG/DL
HCT VFR BLD AUTO: 36.2 % (ref 35–47)
HGB BLD-MCNC: 11.9 G/DL (ref 11.7–15.7)
HGB UR QL STRIP: NEGATIVE
IMM GRANULOCYTES # BLD: 0.1 10E9/L (ref 0–0.4)
IMM GRANULOCYTES NFR BLD: 0.4 %
KETONES UR STRIP-MCNC: NEGATIVE MG/DL
LEUKOCYTE ESTERASE UR QL STRIP: NEGATIVE
LYMPHOCYTES # BLD AUTO: 3 10E9/L (ref 0.8–5.3)
LYMPHOCYTES NFR BLD AUTO: 23 %
MCH RBC QN AUTO: 27 PG (ref 26.5–33)
MCHC RBC AUTO-ENTMCNC: 32.9 G/DL (ref 31.5–36.5)
MCV RBC AUTO: 82 FL (ref 78–100)
MONOCYTES # BLD AUTO: 0.9 10E9/L (ref 0–1.3)
MONOCYTES NFR BLD AUTO: 7 %
NEUTROPHILS # BLD AUTO: 9 10E9/L (ref 1.6–8.3)
NEUTROPHILS NFR BLD AUTO: 68.3 %
NITRATE UR QL: NEGATIVE
NRBC # BLD AUTO: 0 10*3/UL
NRBC BLD AUTO-RTO: 0 /100
PH UR STRIP: 7 PH (ref 5–7)
PLATELET # BLD AUTO: 268 10E9/L (ref 150–450)
POTASSIUM SERPL-SCNC: 3.5 MMOL/L (ref 3.4–5.3)
RBC # BLD AUTO: 4.4 10E12/L (ref 3.8–5.2)
SODIUM SERPL-SCNC: 136 MMOL/L (ref 133–144)
SOURCE: NORMAL
SP GR UR STRIP: 1.01 (ref 1–1.03)
UROBILINOGEN UR STRIP-MCNC: 0 MG/DL (ref 0–2)
WBC # BLD AUTO: 13.2 10E9/L (ref 4–11)

## 2018-09-07 PROCEDURE — 99285 EMERGENCY DEPT VISIT HI MDM: CPT | Mod: 25 | Performed by: EMERGENCY MEDICINE

## 2018-09-07 PROCEDURE — 25000128 H RX IP 250 OP 636: Performed by: EMERGENCY MEDICINE

## 2018-09-07 PROCEDURE — 81003 URINALYSIS AUTO W/O SCOPE: CPT | Performed by: EMERGENCY MEDICINE

## 2018-09-07 PROCEDURE — 85025 COMPLETE CBC W/AUTO DIFF WBC: CPT | Performed by: EMERGENCY MEDICINE

## 2018-09-07 PROCEDURE — 96360 HYDRATION IV INFUSION INIT: CPT | Performed by: EMERGENCY MEDICINE

## 2018-09-07 PROCEDURE — 80048 BASIC METABOLIC PNL TOTAL CA: CPT | Performed by: EMERGENCY MEDICINE

## 2018-09-07 PROCEDURE — 76770 US EXAM ABDO BACK WALL COMP: CPT

## 2018-09-07 PROCEDURE — 99284 EMERGENCY DEPT VISIT MOD MDM: CPT | Mod: Z6 | Performed by: EMERGENCY MEDICINE

## 2018-09-07 RX ORDER — ALBUTEROL SULFATE 90 UG/1
2 AEROSOL, METERED RESPIRATORY (INHALATION) EVERY 6 HOURS PRN
COMMUNITY

## 2018-09-07 RX ORDER — SUMATRIPTAN 100 MG/1
100 TABLET, FILM COATED ORAL
COMMUNITY

## 2018-09-07 RX ADMIN — SODIUM CHLORIDE 500 ML: 9 INJECTION, SOLUTION INTRAVENOUS at 11:29

## 2018-09-07 ASSESSMENT — ENCOUNTER SYMPTOMS
NECK PAIN: 0
ABDOMINAL PAIN: 1
TROUBLE SWALLOWING: 0
FEVER: 0
LIGHT-HEADEDNESS: 0
CHEST TIGHTNESS: 0
WEAKNESS: 0
NAUSEA: 1
BACK PAIN: 0
HEADACHES: 0
PALPITATIONS: 0
CHILLS: 0
DYSURIA: 0
VOMITING: 0
SHORTNESS OF BREATH: 0
DIZZINESS: 0
CONFUSION: 0

## 2018-09-07 NOTE — TELEPHONE ENCOUNTER
"Reason for call:  Patient reporting a symptom    Symptom or request: about 10 wks pregnant.  Having sharp, stabbing pains in back - wrapping around to stomach.    Duration (how long have symptoms been present): since 4 am    Have you been treated for this before? No    Additional comments: hurts so bad - literally \"I can barely move\"  Did make an appt for 10:30am this morning    Phone Number patient can be reached at:  Home number on file [phone] (home)    Best Time:  any    Can we leave a detailed message on this number:  YES    Call taken on 9/7/2018 at 8:27 AM by Jill Leach    "

## 2018-09-07 NOTE — ED NOTES
Pt lifted a heavy resident at 0415 at work and immediately had pain across low abd into back-this pain comes and goes and low back is tender to touch-pt is 10 weeks pregnant-7 week ob check was normal-no vag bleeding

## 2018-09-07 NOTE — TELEPHONE ENCOUNTER
"Spoke to patient on the phone.    S-(situation): \"unbearable, stabbing back pain.\" patient reports this started this morning at 0400. Patient is a CNA and works overnights. Patient denies fever. Patient reports some dysuria, always has frequency being pregnant. Patient reports \" the pain is so bad, I can hardly move.\" patient denies bleeding or abnormal vaginal discharge.    B-(background):     A-(assessment): back pain    R-(recommendations): ER for further evaluation.     Pt in agreement and reports understanding.    Francine Hammer   Ob/Gyn Clinic  RN      "

## 2018-09-07 NOTE — ED PROVIDER NOTES
History     Chief Complaint   Patient presents with     Abdominal Pain     sharp, stabbing low abdj pain, bilateral  and into back. [id number] weeks. vaginal discharge, no bleeding, grav 2,  AB 1     HPI  Cami Ware is a 17 year old female who presents emergency department complaining of lower abdominal stabbing pain radiating into back.  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  This morning she began having the cramping stabbing lower abdominal pain radiating to her back.  This is patient's second pregnancy first 1 was aborted.  She denies any bleeding or discharge she has frequent urination but that is not out of the ordinary and has no pain with urination.  She denies chest pain shortness of breath has had some nausea but denies any significant nausea at this time.  Really rates her pain a 4 out of 10.      Problem List:    Patient Active Problem List    Diagnosis Date Noted     Prenatal care, first pregnancy 08/08/2018     Priority: Medium     DAVINA Miller       History of termination of pregnancy **DOES NOT WANT INFORMATION DISCLOSED TO ANYONE** 11/15/2017     Priority: Medium     Constipation, unspecified constipation type 08/30/2017     Priority: Medium     Migraine without aura and without status migrainosus, not intractable 11/17/2015     Priority: Medium     History of chicken pox 10/09/2015     Priority: Medium     Patient had chicken pox as a child          Past Medical History:    Past Medical History:   Diagnosis Date     Asthma      Varicella without complication 1999       Past Surgical History:    Past Surgical History:   Procedure Laterality Date     MOUTH SURGERY      wisdom teeth       Family History:    Family History   Problem Relation Age of Onset     Thyroid Disease Mother      Graves     Hypertension Mother      Cancer Mother 37     skin cancer      Cancer Brother 2     Wilm's Tumor, passed  "      Social History:  Marital Status:  Single [1]  Social History   Substance Use Topics     Smoking status: Former Smoker     Packs/day: 0.50     Quit date: 5/31/2018     Smokeless tobacco: Never Used     Alcohol use No        Medications:      Prenatal Vit-Fe Fumarate-FA (PRENATAL MULTIVITAMIN PLUS IRON) 27-0.8 MG TABS per tablet         Review of Systems   Constitutional: Negative for chills and fever.   HENT: Negative for congestion and trouble swallowing.    Respiratory: Negative for chest tightness and shortness of breath.    Cardiovascular: Negative for chest pain, palpitations and leg swelling.   Gastrointestinal: Positive for abdominal pain and nausea. Negative for vomiting.   Genitourinary: Negative for decreased urine volume and dysuria.   Musculoskeletal: Negative for back pain and neck pain.   Skin: Negative for rash.   Neurological: Negative for dizziness, weakness, light-headedness and headaches.   Psychiatric/Behavioral: Negative for confusion.       Physical Exam   BP: 143/71  Heart Rate: 100  Temp: 98.1  F (36.7  C)  Resp: 16  Height: 154.9 cm (5' 1\")  Weight: 99.3 kg (219 lb)  SpO2: 99 %      Physical Exam   Constitutional: She appears well-developed and well-nourished. No distress.   HENT:   Head: Normocephalic.   Mouth/Throat: Oropharynx is clear and moist.   Eyes: Conjunctivae are normal.   Neck: Normal range of motion. Neck supple. No JVD present.   Cardiovascular: Normal rate, regular rhythm, normal heart sounds and intact distal pulses.    No murmur heard.  Pulmonary/Chest: Effort normal and breath sounds normal. She has no wheezes. She has no rales.   Abdominal: Soft. Bowel sounds are normal.   Moderately obese nondistended tender to palpation lower abdomen no guarding no rebound bowel sounds positive.   Musculoskeletal: Normal range of motion.   Back with positive tenderness to palpation of the lumbar spine regions no midline back tenderness pulses sensation symmetrical no calf " tenderness.   Neurological: She is alert. She exhibits normal muscle tone.   Skin: Skin is warm and dry. No rash noted.   Psychiatric: She has a normal mood and affect.   Nursing note and vitals reviewed.      ED Course     ED Course     Procedures               Critical Care time:  none               Results for orders placed or performed during the hospital encounter of 09/07/18   Retroperitoneal US    Narrative    US RENAL COMPLETE 9/7/2018 11:01 AM    HISTORY: Ten weeks pregnant. Abdominal pain.    COMPARISON: None.    FINDINGS: The right kidney measures 12.3 x 4.8 x 5.7 cm with a  cortical thickness of 1.5 cm.  The left kidney measures 12.4 x 5.9 x  5.0cm with a cortical thickness of 1.7 cm. The kidneys are normal in  appearance for the patient's age. There is no hydronephrosis. The  bladder is moderately well distended and shows no gross abnormality.      Impression    IMPRESSION: Normal renal ultrasound for the patient's age.    NOLAN SALTER MD   US OB 1st Trimester W Transvaginal W Doppler    Narrative    OBSTETRIC ULTRASOUND WITH ENDOVAGINAL TRANSDUCER September 7, 2018  11:07 AM     HISTORY: 10 week pregnant. Lower abdominal and pelvic cramping pain.     TECHNIQUE: Endovaginal sonography was added to the transabdominal exam  to better evaluate the uterus and ovaries because of inadequate  bladder fullness.    COMPARISON: None.    FINDINGS: There is a single, live intrauterine pregnancy.  Estimated gestational age by current ultrasound measurement: 9 weeks 6  days +/- 1 week.  Estimated date of delivery based on this ultrasound: 4/6/2019.  Crown-rump length: 3.0 cm.   Cardiac activity: 160 BPM.   Yolk sac: Normal.  Subchorionic hemorrhage: None.    Right ovary: Corpus luteum of pregnancy.  Left ovary: Unremarkable. Color-flow Doppler spectral waveform  analysis shows normal blood flow to both ovaries.  Adnexal mass: None.  Free pelvic fluid: None.      Impression    IMPRESSION:   1. Single live intrauterine  pregnancy, 9 weeks 6 days +/- 1 week  gestational age.  2. No adnexal abnormality to account for pelvic cramping.      JOLEEN ROBLES MD         Medications - No data to display    Assessments & Plan (with Medical Decision Making)records were reviewed. Labs were obtained. UA was unremarkable. White count was 13.2 . Bmp was unremarkable. Us of kindeys and pelvic us was obtained. There was a single live intrauterine pregnancy no adnexal abnormality. No evidence of hydronephrosis. Patient is told of findings and feels comfortable follow up with her ob on Monday. She will return if symptoms worsen or new symptoms develop.      I have reviewed the nursing notes.    I have reviewed the findings, diagnosis, plan and need for follow up with the patient.       Discharge Medication List as of 9/7/2018 12:08 PM          Final diagnoses:   Lower abdominal pain   10 weeks gestation of pregnancy   Acute bilateral low back pain without sciatica       9/7/2018   [facility] EMERGENCY DEPARTMENT     Jose Angel Lilly MD  09/09/18 [id number]

## 2018-09-07 NOTE — ED AVS SNAPSHOT
[facility] Emergency Department    [address] [address]    Phone:  [phone]    Fax:  [phone]                                       Cami Ware   MRN: [id number]    Department:  [facility] Emergency Department   Date of Visit:  9/7/2018           After Visit Summary Signature Page     I have received my discharge instructions, and my questions have been answered. I have discussed any challenges I see with this plan with the nurse or doctor.    ..........................................................................................................................................  Patient/Patient Representative Signature      ..........................................................................................................................................  Patient Representative Print Name and Relationship to Patient    ..................................................               ................................................  Date                                            Time    ..........................................................................................................................................  Reviewed by Signature/Title    ...................................................              ..............................................  Date                                                            Time          22EPIC Rev 08/18

## 2018-09-07 NOTE — DISCHARGE INSTRUCTIONS
Return if symptoms worsen or new symptoms develop.  Follow-up with primary care physician/OB next week for recheck.  Drink plenty of fluids.  Take Tylenol for pain.  If increased abdominal pain back pain vaginal bleeding or discharge occur please return for recheck.    Abdominal Pain and Early Pregnancy    The tests you had show that you are pregnant, but the exact cause of your pain isn t clear.  Some pain and bleeding are common early in pregnancy. Often they stop, and you can go on to have a normal pregnancy and baby. Other times the pain or bleeding can be signs of a miscarriage or ectopic pregnancy. An ectopic pregnancy is a very serious problem. At this time it is unclear if your pregnancy will continue normally, if you will have a miscarriage, or if you could have an ectopic pregnancy. Below is some information about this.  Miscarriage  At this time we don t know whether you will have a miscarriage, or if things will clear up and your pregnancy will continue normally. We understand that this is emotionally difficult. There is little we can say to change the way you feel. But understand that miscarriages are common.  About 1 or 2 out of every 10 pregnancies end this way. Some end even before you know you are pregnant. This happens for a number of reasons, and usually we never figure out why. It s important you know that it is not your fault. It didn t happen because you did anything wrong.  Having sex or exercising does not cause a miscarriage. These activities are usually safe unless you have pain or bleeding or your doctor tells you to stop. Even minor falls won t cause a miscarriage. Miscarriages happen because things were not developing as they were supposed to. No medicine can prevent a miscarriage.  Ectopic pregnancy  In a normal pregnancy, the fertilized egg attaches to the wall of the womb (uterus). In an ectopic or tubal pregnancy, the fertilized egg attaches outside the uterus, usually in the  fallopian tube. Very rarely, the egg attaches to an ovary or somewhere else in the abdomen. An ectopic pregnancy is much less common than a miscarriage, but it is very serious. The baby cannot survive, and as it grows it can rupture the tube. This can cause internal bleeding and even death. Risk factors for an ectopic are:    An ectopic pregnancy in the past    Pelvic inflammatory disease, or PID    Endometriosis    Smoking    An IUD  Additional tests  Because we don t know what s causing your symptoms, you will need more tests to figure out what the problem is. You may need the following.  Ultrasound  An ultrasound can usually find a normal pregnancy as early as 4 to 5 weeks along. If the ultrasound does not show the baby inside the uterus, it means one of the following.    You have a normal pregnancy less than 4 weeks along    You are having or recently had a miscarriage    You have an ectopic pregnancy  Quantitative HCG  This test measures the amount of a pregnancy hormone in your blood. Comparing today's test result to a repeat test in 2 days will show whether you have a normal pregnancy.  Laparoscopy  This is a type of surgery. The healthcare provider will put a tube with a light inside your belly (abdomen) to look directly at your pelvic organs. This test is used when it is not safe to wait 2 days for blood test results.  Important information  If you do have an ectopic pregnancy, there is a small chance that the growing fetus can tear the fallopian tube. This can cause severe internal bleeding. If this happens, you may have:    Sudden severe pain in your lower abdomen    Vaginal bleeding    Weakness, dizziness, and sometimes fainting  If any of these symptoms occur:    Call 911or return right away to the hospital.    Don't drive yourself.    Don't go to your healthcare provider's office or to a clinic. Go to the hospital.   Home care  Follow these guidelines to help care for yourself at home:    Rest until  your next exam. Don t do anything strenuous.    Eat a light diet with foods that are easy to digest.    Don t have sex until your healthcare provider says it s OK.  Follow-up care  Follow up with your healthcare provider, or as advised. If you were told to have a repeat blood test in 2 days, it s important to get it done.  If you had an X-ray or ultrasound, a radiologist will review it. You will be told of any new findings that may affect your care.  Call 911  Call 911 if you have any of these:    Severe pain and very heavy bleeding    Severe lightheadedness, passing out, or fainting    Rapid heart rate    Trouble breathing    Confused or difficulty waking up  When to seek medical advice  Call your healthcare provider right away if any of these occur:    The pain in your abdomen gets worse, either suddenly or gradually.    You are dizzy or weak when you stand.    You have heavy vaginal bleeding. This means soaking 1 pad an hour for 3 hours.    You have vaginal bleeding for more than 5 days.    You have repeated vomiting or diarrhea.    The pain in your abdomen moves to the lower right.    You have blood in your vomit or bowel movements. This will be dark red or black.    You have a fever of 100.4 F (38 C) or higher, or as directed by your healthcare provider.  Date Last Reviewed: 10/1/2016    1569-9799 [employer]. [address] [place] [address], [place] [id number]. All rights reserved. This information is not intended as a substitute for professional medical care. Always follow your healthcare professional's instructions.          Back Pain (Acute or Chronic)    Back pain is one of the most common problems. The good news is that most people feel better in 1 to 2 weeks, and most of the rest in 1 to 2 months. Most people can remain active.  People experience and describe pain differently; not everyone is the same.    The pain can be sharp, stabbing, shooting, aching, cramping or burning.    Movement, standing,  bending, lifting, sitting, or walking may worsen pain.    It can be localized to one spot or area, or it can be more generalized.    It can spread or radiate upwards, to the front, or go down your arms or legs (sciatica).    It can cause muscle spasm.  Most of the time, mechanical problems with the muscles or spine cause the pain. Mechanical problems are usually caused by an injury to the muscles or ligaments. While illness can cause back pain, it is usually not caused by a serious illness. Mechanical problems include:     Physical activity such as sports, exercise, work, or normal activity    Overexertion, lifting, pushing, pulling incorrectly or too aggressively    Sudden twisting, bending, or stretching from an accident, or accidental movement    Poor posture    Stretching or moving wrong, without noticing pain at the time    Poor coordination, lack of regular exercise (check with your doctor about this)    Spinal disc disease or arthritis    Stress  Pain can also be related to pregnancy, or illness like appendicitis, bladder or kidney infections, pelvic infections, and many other things.  Acute back pain usually gets better in 1 to 2 weeks. Back pain related to disk disease, arthritis in the spinal joints or spinal stenosis (narrowing of the spinal canal) can become chronic and last for months or years.  Unless you had a physical injury (for example, a car accident or fall) X-rays are usually not needed for the initial evaluation of back pain. If pain continues and does not respond to medical treatment, X-rays and other tests may be needed.  Home care  Try these home care recommendations:    When in bed, try to find a position of comfort. A firm mattress is best. Try lying flat on your back with pillows under your knees. You can also try lying on your side with your knees bent up towards your chest and a pillow between your knees.    At first, do not try to stretch out the sore spots. If there is a strain, it is  not like the good soreness you get after exercising without an injury. In this case, stretching may make it worse.    Avoid prolong sitting, long car rides, or travel. This puts more stress on the lower back than standing or walking.    During the first 24 to 72 hours after an acute injury or flare up of chronic back pain, apply an ice pack to the painful area for 20 minutes and then remove it for 20 minutes. Do this over a period of 60 to 90 minutes or several times a day. This will reduce swelling and pain. Wrap the ice pack in a thin towel or plastic to protect your skin.    You can start with ice, then switch to heat. Heat (hot shower, hot bath, or heating pad) reduces pain and works well for muscle spasms. Heat can be applied to the painful area for 20 minutes then remove it for 20 minutes. Do this over a period of 60 to 90 minutes or several times a day. Do not sleep on a heating pad. It can lead to skin burns or tissue damage.    You can alternate ice and heat therapy. Talk with your doctor about the best treatment for your back pain.    Therapeutic massage can help relax the back muscles without stretching them.    Be aware of safe lifting methods and do not lift anything without stretching first.  Medicines  Talk to your doctor before using medicine, especially if you have other medical problems or are taking other medicines.    You may use over-the-counter medicine as directed on the bottle to control pain, unless another pain medicine was prescribed. If you have chronic conditions like diabetes, liver or kidney disease, stomach ulcers, or gastrointestinal bleeding, or are taking blood thinners, talk to your doctor before taking any medicine.    Be careful if you are given a prescription medicines, narcotics, or medicine for muscle spasms. They can cause drowsiness, affect your coordination, reflexes, and judgement. Do not drive or operate heavy machinery.  Follow-up care  Follow up with your healthcare  "provider, or as advised.   A radiologist will review any X-rays that were taken. Your provide will notify you of any new findings that may affect your care.  Call 911  Call emergency services if any of the following occur:    Trouble breathing    Confusion    Very drowsy or trouble awakening    Fainting or loss of consciousness    Rapid or very slow heart rate    Loss of bowel or bladder control  When to seek medical advice  Call your healthcare provider right away if any of these occur:     Pain becomes worse or spreads to your legs    Weakness or numbness in one or both legs    Numbness in the groin or genital area  Date Last Reviewed: 7/1/2016 2000-2017 [employer]. [address] [place] [id number]. All rights reserved. This information is not intended as a substitute for professional medical care. Always follow your healthcare professional's instructions.          Pregnancy: Your First Trimester Changes  The first trimester is a time of rapid development for your baby. Because your baby is growing so quickly, it is important that you start a healthy lifestyle right away. By the end of the first trimester, your baby has formed all of its major body organs and weighs just over an ounce.     Actual size of baby is 1/4\"    Month 1 (weeks 1 to 4)  The placenta (the organ that nourishes your baby) begins to form. The brain, spinal cord, heart, gastrointestinal tract, and lungs begin to develop. Your baby is about 1/4-inch long by the end of the first month.     Actual size of baby is 1\"      Month 2 (weeks 5 to 8)  All of your baby s major body organs form. The face, fingers, toes, ears, and eyes appear. By the end of the month, your baby is about 1-inch long.     Actual size of baby is 3\"      Month 3 (weeks 9 to 12)  Your baby can open and close its fists and mouth. The sexual organs begin to form. As the first trimester ends, your baby is about 3-inches long.  Date Last Reviewed: " 10/1/2017    7617-4888 [employer]. [address] [place] [address], [place] [address]. All rights reserved. This information is not intended as a substitute for professional medical care. Always follow your healthcare professional's instructions.

## 2018-09-07 NOTE — ED AVS SNAPSHOT
[facility] Emergency Department    [address] [facility] [address]    Phone:  [phone]    Fax:  [phone]                                       Cami Ware   MRN: [id number]    Department:  [facility] Emergency Department   Date of Visit:  9/7/2018           Patient Information     Date Of Birth          2000        Your diagnoses for this visit were:     Lower abdominal pain     10 weeks gestation of pregnancy     Acute bilateral low back pain without sciatica        You were seen by Jose Angel Lilly MD.      Follow-up Information     Follow up with Milena Jacobsen.    Why:  Next week for recheck        Follow up with [facility] Emergency Department.    Specialty:  EMERGENCY MEDICINE    Why:  If symptoms worsen    Contact information:    [address] [place] [id number] [phone]    Additional information:    The medical center is located at   [address] (between [address] and   [address] in [place], [place] miles north   of [place]).        Discharge Instructions       Return if symptoms worsen or new symptoms develop.  Follow-up with primary care physician/OB next week for recheck.  Drink plenty of fluids.  Take Tylenol for pain.  If increased abdominal pain back pain vaginal bleeding or discharge occur please return for recheck.    Abdominal Pain and Early Pregnancy    The tests you had show that you are pregnant, but the exact cause of your pain isn t clear.  Some pain and bleeding are common early in pregnancy. Often they stop, and you can go on to have a normal pregnancy and baby. Other times the pain or bleeding can be signs of a miscarriage or ectopic pregnancy. An ectopic pregnancy is a very serious problem. At this time it is unclear if your pregnancy will continue normally, if you will have a miscarriage, or if you could have an ectopic pregnancy. Below is some information about this.  Miscarriage  At this time we don t know whether  you will have a miscarriage, or if things will clear up and your pregnancy will continue normally. We understand that this is emotionally difficult. There is little we can say to change the way you feel. But understand that miscarriages are common.  About 1 or 2 out of every 10 pregnancies end this way. Some end even before you know you are pregnant. This happens for a number of reasons, and usually we never figure out why. It s important you know that it is not your fault. It didn t happen because you did anything wrong.  Having sex or exercising does not cause a miscarriage. These activities are usually safe unless you have pain or bleeding or your doctor tells you to stop. Even minor falls won t cause a miscarriage. Miscarriages happen because things were not developing as they were supposed to. No medicine can prevent a miscarriage.  Ectopic pregnancy  In a normal pregnancy, the fertilized egg attaches to the wall of the womb (uterus). In an ectopic or tubal pregnancy, the fertilized egg attaches outside the uterus, usually in the fallopian tube. Very rarely, the egg attaches to an ovary or somewhere else in the abdomen. An ectopic pregnancy is much less common than a miscarriage, but it is very serious. The baby cannot survive, and as it grows it can rupture the tube. This can cause internal bleeding and even death. Risk factors for an ectopic are:    An ectopic pregnancy in the past    Pelvic inflammatory disease, or PID    Endometriosis    Smoking    An IUD  Additional tests  Because we don t know what s causing your symptoms, you will need more tests to figure out what the problem is. You may need the following.  Ultrasound  An ultrasound can usually find a normal pregnancy as early as 4 to 5 weeks along. If the ultrasound does not show the baby inside the uterus, it means one of the following.    You have a normal pregnancy less than 4 weeks along    You are having or recently had a miscarriage    You have  an ectopic pregnancy  Quantitative HCG  This test measures the amount of a pregnancy hormone in your blood. Comparing today's test result to a repeat test in 2 days will show whether you have a normal pregnancy.  Laparoscopy  This is a type of surgery. The healthcare provider will put a tube with a light inside your belly (abdomen) to look directly at your pelvic organs. This test is used when it is not safe to wait 2 days for blood test results.  Important information  If you do have an ectopic pregnancy, there is a small chance that the growing fetus can tear the fallopian tube. This can cause severe internal bleeding. If this happens, you may have:    Sudden severe pain in your lower abdomen    Vaginal bleeding    Weakness, dizziness, and sometimes fainting  If any of these symptoms occur:    Call 911or return right away to the hospital.    Don't drive yourself.    Don't go to your healthcare provider's office or to a clinic. Go to the hospital.   Home care  Follow these guidelines to help care for yourself at home:    Rest until your next exam. Don t do anything strenuous.    Eat a light diet with foods that are easy to digest.    Don t have sex until your healthcare provider says it s OK.  Follow-up care  Follow up with your healthcare provider, or as advised. If you were told to have a repeat blood test in 2 days, it s important to get it done.  If you had an X-ray or ultrasound, a radiologist will review it. You will be told of any new findings that may affect your care.  Call 911  Call 911 if you have any of these:    Severe pain and very heavy bleeding    Severe lightheadedness, passing out, or fainting    Rapid heart rate    Trouble breathing    Confused or difficulty waking up  When to seek medical advice  Call your healthcare provider right away if any of these occur:    The pain in your abdomen gets worse, either suddenly or gradually.    You are dizzy or weak when you stand.    You have heavy vaginal  bleeding. This means soaking 1 pad an hour for 3 hours.    You have vaginal bleeding for more than 5 days.    You have repeated vomiting or diarrhea.    The pain in your abdomen moves to the lower right.    You have blood in your vomit or bowel movements. This will be dark red or black.    You have a fever of 100.4 F (38 C) or higher, or as directed by your healthcare provider.  Date Last Reviewed: 10/1/2016    4525-4649 The [email or web address]. [address] [place] [address]. All rights reserved. This information is not intended as a substitute for professional medical care. Always follow your healthcare professional's instructions.          Back Pain (Acute or Chronic)    Back pain is one of the most common problems. The good news is that most people feel better in 1 to 2 weeks, and most of the rest in 1 to 2 months. Most people can remain active.  People experience and describe pain differently; not everyone is the same.    The pain can be sharp, stabbing, shooting, aching, cramping or burning.    Movement, standing, bending, lifting, sitting, or walking may worsen pain.    It can be localized to one spot or area, or it can be more generalized.    It can spread or radiate upwards, to the front, or go down your arms or legs (sciatica).    It can cause muscle spasm.  Most of the time, mechanical problems with the muscles or spine cause the pain. Mechanical problems are usually caused by an injury to the muscles or ligaments. While illness can cause back pain, it is usually not caused by a serious illness. Mechanical problems include:     Physical activity such as sports, exercise, work, or normal activity    Overexertion, lifting, pushing, pulling incorrectly or too aggressively    Sudden twisting, bending, or stretching from an accident, or accidental movement    Poor posture    Stretching or moving wrong, without noticing pain at the time    Poor coordination, lack of regular exercise (check with  your doctor about this)    Spinal disc disease or arthritis    Stress  Pain can also be related to pregnancy, or illness like appendicitis, bladder or kidney infections, pelvic infections, and many other things.  Acute back pain usually gets better in 1 to 2 weeks. Back pain related to disk disease, arthritis in the spinal joints or spinal stenosis (narrowing of the spinal canal) can become chronic and last for months or years.  Unless you had a physical injury (for example, a car accident or fall) X-rays are usually not needed for the initial evaluation of back pain. If pain continues and does not respond to medical treatment, X-rays and other tests may be needed.  Home care  Try these home care recommendations:    When in bed, try to find a position of comfort. A firm mattress is best. Try lying flat on your back with pillows under your knees. You can also try lying on your side with your knees bent up towards your chest and a pillow between your knees.    At first, do not try to stretch out the sore spots. If there is a strain, it is not like the good soreness you get after exercising without an injury. In this case, stretching may make it worse.    Avoid prolong sitting, long car rides, or travel. This puts more stress on the lower back than standing or walking.    During the first 24 to 72 hours after an acute injury or flare up of chronic back pain, apply an ice pack to the painful area for 20 minutes and then remove it for 20 minutes. Do this over a period of 60 to 90 minutes or several times a day. This will reduce swelling and pain. Wrap the ice pack in a thin towel or plastic to protect your skin.    You can start with ice, then switch to heat. Heat (hot shower, hot bath, or heating pad) reduces pain and works well for muscle spasms. Heat can be applied to the painful area for 20 minutes then remove it for 20 minutes. Do this over a period of 60 to 90 minutes or several times a day. Do not sleep on a  heating pad. It can lead to skin burns or tissue damage.    You can alternate ice and heat therapy. Talk with your doctor about the best treatment for your back pain.    Therapeutic massage can help relax the back muscles without stretching them.    Be aware of safe lifting methods and do not lift anything without stretching first.  Medicines  Talk to your doctor before using medicine, especially if you have other medical problems or are taking other medicines.    You may use over-the-counter medicine as directed on the bottle to control pain, unless another pain medicine was prescribed. If you have chronic conditions like diabetes, liver or kidney disease, stomach ulcers, or gastrointestinal bleeding, or are taking blood thinners, talk to your doctor before taking any medicine.    Be careful if you are given a prescription medicines, narcotics, or medicine for muscle spasms. They can cause drowsiness, affect your coordination, reflexes, and judgement. Do not drive or operate heavy machinery.  Follow-up care  Follow up with your healthcare provider, or as advised.   A radiologist will review any X-rays that were taken. Your provide will notify you of any new findings that may affect your care.  Call 911  Call emergency services if any of the following occur:    Trouble breathing    Confusion    Very drowsy or trouble awakening    Fainting or loss of consciousness    Rapid or very slow heart rate    Loss of bowel or bladder control  When to seek medical advice  Call your healthcare provider right away if any of these occur:     Pain becomes worse or spreads to your legs    Weakness or numbness in one or both legs    Numbness in the groin or genital area  Date Last Reviewed: 7/1/2016 2000-2017 The [employer]. [address] [place] [address], [place] [address]. All rights reserved. This information is not intended as a substitute for professional medical care. Always follow your healthcare professional's  "instructions.          Pregnancy: Your First Trimester Changes  The first trimester is a time of rapid development for your baby. Because your baby is growing so quickly, it is important that you start a healthy lifestyle right away. By the end of the first trimester, your baby has formed all of its major body organs and weighs just over an ounce.     Actual size of baby is 1/4\"    Month 1 (weeks 1 to 4)  The placenta (the organ that nourishes your baby) begins to form. The brain, spinal cord, heart, gastrointestinal tract, and lungs begin to develop. Your baby is about 1/4-inch long by the end of the first month.     Actual size of baby is 1\"      Month 2 (weeks 5 to 8)  All of your baby s major body organs form. The face, fingers, toes, ears, and eyes appear. By the end of the month, your baby is about 1-inch long.     Actual size of baby is 3\"      Month 3 (weeks 9 to 12)  Your baby can open and close its fists and mouth. The sexual organs begin to form. As the first trimester ends, your baby is about 3-inches long.  Date Last Reviewed: 10/1/2017    7771-9133 The [employer]. [address] [place] [address]. All rights reserved. This information is not intended as a substitute for professional medical care. Always follow your healthcare professional's instructions.          Your next 10 appointments already scheduled     Sep 18, 2018  8:30 AM CDT   ESTABLISHED PRENATAL with Vanna Conde MD   [facility] ([facility]    [address] [place] [id number] [phone]              24 Hour Appointment Hotline       To make an appointment at any [facility], call [phone] ([phone]). If you don't have a family doctor or clinic, we will help you find one. [facility] (formerly [facility]) are conveniently located to serve the needs of you and your family.             Review of your medicines      Our records show that you are taking the medicines " listed below. If these are incorrect, please call your family doctor or clinic.        Dose / Directions Last dose taken    albuterol 108 (90 Base) MCG/ACT inhaler   Commonly known as:  PROAIR HFA/PROVENTIL HFA/VENTOLIN HFA   Dose:  2 puff        Inhale 2 puffs into the lungs every 6 hours as needed for shortness of breath / dyspnea or wheezing   Refills:  0        prenatal multivitamin plus iron 27-0.8 MG Tabs per tablet   Dose:  1 tablet        Take 1 tablet by mouth daily   Refills:  0        SUMAtriptan 100 MG tablet   Commonly known as:  IMITREX   Dose:  100 mg        Take 100 mg by mouth at onset of headache for migraine TK 1 T PO AT ONSET OF HEADACHE FOR MIGRAINE. MAY REPEAT IN 2 H. MAX 2 TS IN 24 H   Refills:  0                Procedures and tests performed during your visit     Basic metabolic panel    CBC with platelets, differential    Retroperitoneal US    UA reflex to Microscopic    US OB 1st Trimester W Transvaginal W Doppler      Orders Needing Specimen Collection     None      Pending Results     Date and Time Order Name Status Description    9/7/2018 0922 US OB 1st Trimester W Transvaginal W Doppler Preliminary             Pending Culture Results     No orders found from 9/5/2018 to 9/8/2018.            Pending Results Instructions     If you had any lab results that were not finalized at the time of your Discharge, you can call the ED Lab Result RN at [phone]. You will be contacted by this team for any positive Lab results or changes in treatment. The nurses are available 7 days a week from 10A to 6:30P.  You can leave a message 24 hours per day and they will return your call.        Test Results From Your Hospital Stay        9/7/2018  9:55 AM      Component Results     Component Value Ref Range & Units Status    WBC 13.2 (H) 4.0 - 11.0 10e9/L Final    RBC Count 4.40 3.8 - 5.2 10e12/L Final    Hemoglobin 11.9 11.7 - 15.7 g/dL Final    Hematocrit 36.2 35.0 - 47.0 % Final    MCV 82 78 - 100 fl  Final    MCH 27.0 26.5 - 33.0 pg Final    MCHC 32.9 31.5 - 36.5 g/dL Final    RDW 13.4 10.0 - 15.0 % Final    Platelet Count 268 150 - 450 10e9/L Final    Diff Method Automated Method  Final    % Neutrophils 68.3 % Final    % Lymphocytes 23.0 % Final    % Monocytes 7.0 % Final    % Eosinophils 1.0 % Final    % Basophils 0.3 % Final    % Immature Granulocytes 0.4 % Final    Nucleated RBCs 0 0 /100 Final    Absolute Neutrophil 9.0 (H) 1.6 - 8.3 10e9/L Final    Absolute Lymphocytes 3.0 0.8 - 5.3 10e9/L Final    Absolute Monocytes 0.9 0.0 - 1.3 10e9/L Final    Absolute Eosinophils 0.1 0.0 - 0.7 10e9/L Final    Absolute Basophils 0.0 0.0 - 0.2 10e9/L Final    Abs Immature Granulocytes 0.1 0 - 0.4 10e9/L Final    Absolute Nucleated RBC 0.0  Final         9/7/2018 10:15 AM      Component Results     Component Value Ref Range & Units Status    Sodium 136 133 - 144 mmol/L Final    Potassium 3.5 3.4 - 5.3 mmol/L Final    Chloride 107 96 - 110 mmol/L Final    Carbon Dioxide 23 20 - 32 mmol/L Final    Anion Gap 6 3 - 14 mmol/L Final    Glucose 87 70 - 99 mg/dL Final    Urea Nitrogen 7 7 - 30 mg/dL Final    Creatinine 0.63 0.50 - 1.00 mg/dL Final    GFR Estimate >90 >60 mL/min/1.7m2 Final    Non  GFR Calc    GFR Estimate If Black >90 >60 mL/min/1.7m2 Final    African American GFR Calc    Calcium 8.7 8.5 - 10.1 mg/dL Final         9/7/2018 11:58 AM      Component Results     Component Value Ref Range & Units Status    Color Urine Yellow  Final    Appearance Urine Clear  Final    Glucose Urine Negative NEG^Negative mg/dL Final    Bilirubin Urine Negative NEG^Negative Final    Ketones Urine Negative NEG^Negative mg/dL Final    Specific Gravity Urine 1.012 1.003 - 1.035 Final    Blood Urine Negative NEG^Negative Final    pH Urine 7.0 5.0 - 7.0 pH Final    Protein Albumin Urine Negative NEG^Negative mg/dL Final    Urobilinogen mg/dL 0.0 0.0 - 2.0 mg/dL Final    Nitrite Urine Negative NEG^Negative Final    Leukocyte  Esterase Urine Negative NEG^Negative Final    Source Midstream Urine  Final               9/7/2018 11:05 AM      Narrative     US RENAL COMPLETE 9/7/2018 11:01 AM    HISTORY: Ten weeks pregnant. Abdominal pain.    COMPARISON: None.    FINDINGS: The right kidney measures 12.3 x 4.8 x 5.7 cm with a  cortical thickness of 1.5 cm.  The left kidney measures 12.4 x 5.9 x  5.0cm with a cortical thickness of 1.7 cm. The kidneys are normal in  appearance for the patient's age. There is no hydronephrosis. The  bladder is moderately well distended and shows no gross abnormality.        Impression     IMPRESSION: Normal renal ultrasound for the patient's age.    NOLAN SALTER MD         9/7/2018 11:19 AM      Narrative     OBSTETRIC ULTRASOUND WITH ENDOVAGINAL TRANSDUCER September 7, 2018  11:07 AM     HISTORY: 10 week pregnant. Lower abdominal and pelvic cramping pain.     TECHNIQUE: Endovaginal sonography was added to the transabdominal exam  to better evaluate the uterus and ovaries because of inadequate  bladder fullness.    COMPARISON: None.    FINDINGS: There is a single, live intrauterine pregnancy.  Estimated gestational age by current ultrasound measurement: 9 weeks 6  days +/- 1 week.  Estimated date of delivery based on this ultrasound: 4/6/2019.  Crown-rump length: 3.0 cm.   Cardiac activity: 160 BPM.   Yolk sac: Normal.  Subchorionic hemorrhage: None.    Right ovary: Corpus luteum of pregnancy.  Left ovary: Unremarkable. Color-flow Doppler spectral waveform  analysis shows normal blood flow to both ovaries.  Adnexal mass: None.  Free pelvic fluid: None.        Impression     IMPRESSION:   1. Single live intrauterine pregnancy, 9 weeks 6 days +/- 1 week  gestational age.  2. No adnexal abnormality to account for pelvic cramping.                    Thank you for choosing [facility]       Thank you for choosing [facility] for your care. Our goal is always to provide you with excellent care. Hearing back from our patients  is one way we can continue to improve our services. Please take a few minutes to complete the written survey that you may receive in the mail after you visit with us. Thank you!        [employer] Information     [facility] gives you secure access to your electronic health record. If you see a primary care provider, you can also send messages to your care team and make appointments. If you have questions, please call your primary care clinic.  If you do not have a primary care provider, please call [phone] and they will assist you.        Care EveryWhere ID     This is your Care EveryWhere ID. This could be used by other organizations to access your [facility] medical records  [id number]        Equal Access to Services     YOVANY CH : Michelle Collins, angie baer, sandra whaley, aneudy thomas. [facility] [phone].    ATENCIÓN: Si habla español, tiene a mcgee disposición servicios gratuitos de asistencia lingüística. Llame al [phone].    We comply with applicable federal civil rights laws and [place] laws. We do not discriminate on the basis of race, color, national origin, age, disability, sex, sexual orientation, or gender identity.            After Visit Summary       This is your record. Keep this with you and show to your community pharmacist(s) and doctor(s) at your next visit.

## 2018-09-13 ENCOUNTER — HOSPITAL ENCOUNTER (OUTPATIENT)
Facility: CLINIC | Age: 18
End: 2018-09-13
Admitting: OBSTETRICS & GYNECOLOGY
Payer: COMMERCIAL

## 2018-09-18 ENCOUNTER — PRENATAL OFFICE VISIT (OUTPATIENT)
Dept: OBGYN | Facility: CLINIC | Age: 18
End: 2018-09-18
Payer: COMMERCIAL

## 2018-09-18 VITALS
TEMPERATURE: 98.1 F | DIASTOLIC BLOOD PRESSURE: 75 MMHG | BODY MASS INDEX: 43.23 KG/M2 | HEIGHT: 61 IN | SYSTOLIC BLOOD PRESSURE: 124 MMHG | HEART RATE: 97 BPM | RESPIRATION RATE: 16 BRPM | WEIGHT: 229 LBS

## 2018-09-18 DIAGNOSIS — Z23 NEED FOR PROPHYLACTIC VACCINATION AND INOCULATION AGAINST INFLUENZA: Primary | ICD-10-CM

## 2018-09-18 DIAGNOSIS — Z34.01 ENCOUNTER FOR PRENATAL CARE IN FIRST TRIMESTER OF FIRST PREGNANCY: ICD-10-CM

## 2018-09-18 PROCEDURE — 99207 ZZC PRENATAL VISIT: CPT | Performed by: OBSTETRICS & GYNECOLOGY

## 2018-09-18 PROCEDURE — 90471 IMMUNIZATION ADMIN: CPT | Performed by: OBSTETRICS & GYNECOLOGY

## 2018-09-18 PROCEDURE — 90686 IIV4 VACC NO PRSV 0.5 ML IM: CPT | Performed by: OBSTETRICS & GYNECOLOGY

## 2018-09-18 NOTE — MR AVS SNAPSHOT
After Visit Summary   9/18/2018    Cami Ware    MRN: [id number]           Patient Information     Date Of Birth          2000        Visit Information        Provider Department      9/18/2018 8:30 AM Vanna Conde MD [facility]        Today's Diagnoses     Need for prophylactic vaccination and inoculation against influenza    -  1    Encounter for prenatal care in first trimester of first pregnancy           Follow-ups after your visit        Follow-up notes from your care team     Return in about 4 weeks (around 10/16/2018).      Your next 10 appointments already scheduled     Oct 23, 2018  9:15 AM CDT   ESTABLISHED PRENATAL with [facility]aria Kramer MD   [facility]    [address] [address]   [phone]            Nov 19, 2018  8:00 AM CST   ESTABLISHED PRENATAL with Zaria Kramer MD   [facility]    [address] [address]   [phone]              Who to contact     If you have questions or need follow up information about today's clinic visit or your schedule please contact [facility] directly at [phone].  Normal or non-critical lab and imaging results will be communicated to you by Metastormhart, letter or phone within 4 business days after the clinic has received the results. If you do not hear from us within 7 days, please contact the clinic through Metastormhart or phone. If you have a critical or abnormal lab result, we will notify you by phone as soon as possible.  Submit refill requests through WhiteSmoke or call your pharmacy and they will forward the refill request to us. Please allow 3 business days for your refill to be completed.          Additional Information About Your Visit        Metastormhart Information     WhiteSmoke gives you secure access to your electronic health record. If you see a primary care  Worker's Compensation Initial Visit     Employer: [employer] ( [employer])  Date of Injury: 11/20/2017  Date of Visit: 11/21/2017    CC:   Chief Complaint   Patient presents with   • Worker's Compensation     WRI BACK PAIN/TERIARLYN REAVESGREGGPREMA/DOI 11/21/17         HPI: Jitendra Coley is a 53 year old male, who presents with a complaint of upper back pain that occurred while he was at work.     He states that while he was at work on 11/20/2017, he was sitting on a stool with some boxes that weighed approximately 100 pounds behind him. Patient states that one of his coworkers accidentally bumped the stack causing the boxes to fall and hit him in his mid upper back. Patient states that he did have some mild discomfort initially but did not think much of the injury. Patient states that by the end of his workday he had some stiffness and discomfort around his shoulder blade and mid back region. Patient did take a hot shower which he felt helped but when he exited the shower he did feel that his back became stiffer. Patient states the rest of the evening he did apply a heating pad to the mid upper back with some relief of his discomfort. When he awoke this morning he had increased discomfort, stiffness and soreness. Patient was concerned as his pain was radiating up into the neck and down the lower into the back. Patient denies any numbness or tingling to the extremities. He denies any loss of bowel or bladder function. Patient states his discomfort is all located to the right side of the upper back and neck. He denies any previous injuries to this area. Patient has been taking ibuprofen with no relief of his pain..    He denies any other precipitating event or activity.  He has no history of previous problems with this part of the body.  He localizes the pain to the right upper back with radiation into the neck.  The severity of the pain is 3-4 out of 10 and it has been increasing.  The pain is exacerbated by movement and  "provider, you can also send messages to your care team and make appointments. If you have questions, please call your primary care clinic.  If you do not have a primary care provider, please call [phone] and they will assist you.        Care EveryWhere ID     This is your Care EveryWhere ID. This could be used by other organizations to access your [place] medical records  [id number]        Your Vitals Were     Pulse Temperature Respirations Height Last Period Breastfeeding?    97 98.1  F (36.7  C) (Tympanic) 16 5' 1\" (1.549 m) 06/30/2018 No    BMI (Body Mass Index)                   43.27 kg/m2            Blood Pressure from Last 3 Encounters:   09/18/18 124/75   09/07/18 119/88   08/21/18 122/78    Weight from Last 3 Encounters:   09/18/18 229 lb (103.9 kg) (99 %)*   09/07/18 219 lb (99.3 kg) (99 %)*   08/21/18 222 lb 9.6 oz (101 kg) (99 %)*     * Growth percentiles are based on CDC 2-20 Years data.              We Performed the Following     FLU VACCINE, SPLIT VIRUS, IM (QUADRIVALENT) [64527]- >3 YRS     Vaccine Administration, Initial [96823]        Primary Care Provider Office Phone # Fax #    Jihan JESÚS [facility] [phone] [address] [address]        Equal Access to Services     YOVANY CH AH: Hadii asha riceo Sojazmyne, waaxda luqadaha, qaybta kaalmada judd, aenudy thomas. So [facility] [phone].    ATENCIÓN: Si habla español, tiene a mcgee disposición servicios gratuitos de asistencia lingüística. Llame al [phone].    We comply with applicable federal civil rights laws and [place] laws. We do not discriminate on the basis of race, color, national origin, age, disability, sex, sexual orientation, or gender identity.            Thank you!     Thank you for choosing [facility]  for your care. Our goal is always to provide you with excellent care. Hearing back from our patients is one way we can " activity.  The pain is relieved by nothing.    Denies back pain or joint pain Denies headache, focal weakness or sensory changes Denies fever or chills   The patient denies  leg symptoms such as leg pain, paresthesias or weakness.   The patient denies any loss of bowel or bladder control.    ROS: A six point review of system was performed including general constitutional, ENT, pulmonary, GI, musculoskeletal and dermatological symptoms.  Findings that are pertinent to this evaluaton are included in the HPI     MEDS: The patient's medication list is reviewed in the electronic record.  ALLERGY: The patient's allergies are reviewed in the electronic record.    PMH: Past medical history is reviewed and findings pertinent to the injury are included in the HPI.  PSH: Past surgical history is reviewed and findings pertinent to the injury are included in the HPI  Smoking HIstory:  reports that he has never smoked. He has never used smokeless tobacco.    PE: The patient is a well developed male, who appears in no acute distress.    Visit Vitals  /82   Pulse 76   Resp 20   Ht 5' 9\" (1.753 m)   Wt 99.3 kg   BMI 32.34 kg/m²   Neck:  Normal neck with normal range of motion. Range of motion in all directions does cause some mild increased discomfort to the right side of the neck. Mild tenderness in the right trapezius and paraspinal musculature.. Supple.   Cardiovascular:  Regular rhythm. Normal sounds and absence of murmurs, rubs or gallops.  Lungs:  Clear to auscultation without rales, rhonchi, or wheezing.  Extremities:  Nonedematous and normal distal pulses.  Back:  There is no midline cervical, thoracic, or lumbar bony tenderness noted on exam. Tenderness is noted to the cervical region as stated above. Tenderness is also noted to the mid thoracic region with tenderness around the right scapula. No obvious bruising or evidence of trauma is noted on exam. Full range of motion to the lumbar and thoracic spine is noted on  continue to improve our services. Please take a few minutes to complete the written survey that you may receive in the mail after your visit with us. Thank you!             Your Updated Medication List - Protect others around you: Learn how to safely use, store and throw away your medicines at www.disposemymeds.org.          This list is accurate as of 9/18/18 11:59 AM.  Always use your most recent med list.                   Brand Name Dispense Instructions for use Diagnosis    albuterol 108 (90 Base) MCG/ACT inhaler    PROAIR HFA/PROVENTIL HFA/VENTOLIN HFA     Inhale 2 puffs into the lungs every 6 hours as needed for shortness of breath / dyspnea or wheezing        prenatal multivitamin plus iron 27-0.8 MG Tabs per tablet      Take 1 tablet by mouth daily        SUMAtriptan 100 MG tablet    IMITREX     Take 100 mg by mouth at onset of headache for migraine TK 1 T PO AT ONSET OF HEADACHE FOR MIGRAINE. MAY REPEAT IN 2 H. MAX 2 TS IN 24 H           exam without increased discomfort.  Neurologic:  Nonfocal and normal sensation.     DIAGNOSTICS:  None currently    ASSESSMENT:  1. Contusion of back wall of thorax, initial encounter    2. Strain of neck muscle, initial encounter        This injury is determined to be work-related.    Anticipated maximum medical improvement: 4-6 weeks    PLAN:  Return to work: with restrictions  Restrictions are to be followed at work and at home.  Restrictions are in effect until next follow-up visit.  No work above chest height.  No lifting, pushing, or pulling greater than 15 pounds.  Allow patient to sit as needed for pain control.  No bending and twisting activities.    TREATMENT PLAN:  Medications for this injury/condition:   Tylenol every 4-6 hours as needed for increased discomfort. Tramadol can be used at bedtime. Tramadol does cause sedation and should not be taken while working or driving.  Referral/Consult:  Diagnostic Testing:   None   Drug test completed.  Breath alcohol test completed.      Instructions:   Continue with at-home exercise program. Return to the clinic should your symptoms worsen, change, or should you have any other concerns. Alternate between heat and ice to the area as this will also help with your discomfort.    NEXT RETURN VISIT: Next appt Tuesday November 28th @ 3:00pm at [facility] [place]  Thank you for the privilege of providing medical care for this injury/condition.  If there are any questions, please call the occupational health clinic at Dept: [phone].    Electronically signed on 11/21/2017 at 12:24 PM by:   Kely Hebert PA-C   [facility]    The physician below agrees with the plan and restrictions placed on the patient by the provider above.  Jitendra Rey MD          The patient's employer was notified.

## 2018-09-18 NOTE — PROGRESS NOTES
Already gained 10 pounds, feels fatigued and nauseated, can only eat carbs.    Desires genetic screening test at 15 weeks.    17 year old  at 11w3d   - flu shot today  - quad screen for 15+ weeks  - discussed palliative measures for nausea, OTC meds    RTC 4 weeks    Vanna Conde MD, MPH  [facility]

## 2018-09-18 NOTE — PROGRESS NOTES

## 2018-09-18 NOTE — NURSING NOTE
"Initial /75 (BP Location: Right arm, Patient Position: Sitting, Cuff Size: Adult Large)  Pulse 97  Temp 98.1  F (36.7  C) (Tympanic)  Resp 16  Ht 5' 1\" (1.549 m)  Wt 229 lb (103.9 kg)  LMP 06/30/2018  Breastfeeding? No  BMI 43.27 kg/m2 Estimated body mass index is 43.27 kg/(m^2) as calculated from the following:    Height as of this encounter: 5' 1\" (1.549 m).    Weight as of this encounter: 229 lb (103.9 kg). .    Yanci Monreal, [facility]    "

## 2018-10-23 ENCOUNTER — PRENATAL OFFICE VISIT (OUTPATIENT)
Dept: OBGYN | Facility: CLINIC | Age: 18
End: 2018-10-23
Payer: COMMERCIAL

## 2018-10-23 VITALS
BODY MASS INDEX: 44.97 KG/M2 | HEART RATE: 98 BPM | WEIGHT: 238 LBS | SYSTOLIC BLOOD PRESSURE: 120 MMHG | TEMPERATURE: 97.6 F | DIASTOLIC BLOOD PRESSURE: 73 MMHG

## 2018-10-23 DIAGNOSIS — Z34.02 ENCOUNTER FOR PRENATAL CARE IN SECOND TRIMESTER OF FIRST PREGNANCY: Primary | ICD-10-CM

## 2018-10-23 PROCEDURE — 99207 ZZC PRENATAL VISIT: CPT | Performed by: OBSTETRICS & GYNECOLOGY

## 2018-10-23 NOTE — PROGRESS NOTES
Doing well.   Endorses mild cramping.   Denies VB.   LMP 2018  General Appearance: NAD  Abdomen: Gravid, NT  Refer to flow sheet above.   A/P: 17 year old  at 16w3d  -- declines QUAD testing  -- fetal anatomy US ordered  -- SAB precautions reviewed  RTC in 4 weeks    Zaria Kramer MD  [facility]

## 2018-10-23 NOTE — MR AVS SNAPSHOT
After Visit Summary   10/23/2018    Cami Ware    MRN: [id number]           Patient Information     Date Of Birth          2000        Visit Information        Provider Department      10/23/2018 9:15 AM Zaria Kramer MD [facility]        Today's Diagnoses     Encounter for prenatal care in second trimester of first pregnancy    -  1       Follow-ups after your visit        Your next 10 appointments already scheduled     Nov 19, 2018  8:00 AM CST   ESTABLISHED PRENATAL with Zaria Kramer MD   [facility]    [address] [address]   [phone]              Future tests that were ordered for you today     Open Future Orders        Priority Expected Expires Ordered    US OB > 14 Weeks Routine  10/23/2019 10/23/2018            Who to contact     If you have questions or need follow up information about today's clinic visit or your schedule please contact [facility] directly at [phone].  Normal or non-critical lab and imaging results will be communicated to you by TribaLearninghart, letter or phone within 4 business days after the clinic has received the results. If you do not hear from us within 7 days, please contact the clinic through TestSoupt or phone. If you have a critical or abnormal lab result, we will notify you by phone as soon as possible.  Submit refill requests through Demibooks or call your pharmacy and they will forward the refill request to us. Please allow 3 business days for your refill to be completed.          Additional Information About Your Visit        TribaLearninghart Information     Demibooks gives you secure access to your electronic health record. If you see a primary care provider, you can also send messages to your care team and make appointments. If you have questions, please call your primary care clinic.  If you do not have a primary care provider, please call  [phone] and they will assist you.        Care EveryWhere ID     This is your Care EveryWhere ID. This could be used by other organizations to access your [place] medical records  [id number]        Your Vitals Were     Pulse Temperature Last Period Breastfeeding? BMI (Body Mass Index)       98 97.6  F (36.4  C) (Tympanic) 06/30/2018 No 44.97 kg/m2        Blood Pressure from Last 3 Encounters:   10/23/18 120/73   09/18/18 124/75   09/07/18 119/88    Weight from Last 3 Encounters:   10/23/18 238 lb (108 kg) (>99 %)*   09/18/18 229 lb (103.9 kg) (99 %)*   09/07/18 219 lb (99.3 kg) (99 %)*     * Growth percentiles are based on [facility] 2-20 Years data.               Primary Care Provider Office Phone # Fax #    Jihan Pinto Renzo Jacobsen, APRN [facility] [phone] [address] [address]        Equal Access to Services     KIZZY CH : Hadii aad ku hadasho Sojazmyne, waaxda luqadaha, qaybta kaalmada adeegvahe, aneudy wagner . So [facility] [phone].    ATENCIÓN: Si habla español, tiene a mcgee disposición servicios gratuitos de asistencia lingüística. Llame al [phone].    We comply with applicable federal civil rights laws and [place] laws. We do not discriminate on the basis of race, color, national origin, age, disability, sex, sexual orientation, or gender identity.            Thank you!     Thank you for choosing [facility]  for your care. Our goal is always to provide you with excellent care. Hearing back from our patients is one way we can continue to improve our services. Please take a few minutes to complete the written survey that you may receive in the mail after your visit with us. Thank you!             Your Updated Medication List - Protect others around you: Learn how to safely use, store and throw away your medicines at www.disposemymeds.org.          This list is accurate as of 10/23/18  9:37 AM.  Always use your most recent med  list.                   Brand Name Dispense Instructions for use Diagnosis    albuterol 108 (90 Base) MCG/ACT inhaler    PROAIR HFA/PROVENTIL HFA/VENTOLIN HFA     Inhale 2 puffs into the lungs every 6 hours as needed for shortness of breath / dyspnea or wheezing        prenatal multivitamin plus iron 27-0.8 MG Tabs per tablet      Take 1 tablet by mouth daily        SUMAtriptan 100 MG tablet    IMITREX     Take 100 mg by mouth at onset of headache for migraine TK 1 T PO AT ONSET OF HEADACHE FOR MIGRAINE. MAY REPEAT IN 2 H. MAX 2 TS IN 24 H

## 2018-10-23 NOTE — NURSING NOTE
"Initial /73 (BP Location: Right arm, Patient Position: Chair, Cuff Size: Adult Large)  Pulse 98  Temp 97.6  F (36.4  C) (Tympanic)  Wt 238 lb (108 kg)  LMP 06/30/2018  Breastfeeding? No  BMI 44.97 kg/m2 Estimated body mass index is 44.97 kg/(m^2) as calculated from the following:    Height as of 9/18/18: 5' 1\" (1.549 m).    Weight as of this encounter: 238 lb (108 kg). .    China Marks    "

## 2018-10-26 ENCOUNTER — HOSPITAL ENCOUNTER (EMERGENCY)
Facility: CLINIC | Age: 18
End: 2018-10-26
Payer: COMMERCIAL

## 2018-10-26 ENCOUNTER — PRENATAL OFFICE VISIT (OUTPATIENT)
Dept: OBGYN | Facility: CLINIC | Age: 18
End: 2018-10-26
Payer: COMMERCIAL

## 2018-10-26 ENCOUNTER — TELEPHONE (OUTPATIENT)
Dept: OBGYN | Facility: CLINIC | Age: 18
End: 2018-10-26

## 2018-10-26 VITALS
TEMPERATURE: 97.4 F | HEART RATE: 102 BPM | SYSTOLIC BLOOD PRESSURE: 121 MMHG | WEIGHT: 238.8 LBS | RESPIRATION RATE: 18 BRPM | DIASTOLIC BLOOD PRESSURE: 72 MMHG | HEIGHT: 61 IN | BODY MASS INDEX: 45.09 KG/M2

## 2018-10-26 DIAGNOSIS — S39.91XA ABDOMINAL TRAUMA, INITIAL ENCOUNTER: ICD-10-CM

## 2018-10-26 DIAGNOSIS — Z34.02 ENCOUNTER FOR PRENATAL CARE IN SECOND TRIMESTER OF FIRST PREGNANCY: Primary | ICD-10-CM

## 2018-10-26 LAB
APTT PPP: 49 SEC (ref 22–37)
ERYTHROCYTE [DISTWIDTH] IN BLOOD BY AUTOMATED COUNT: 13.7 % (ref 10–15)
FIBRINOGEN PPP-MCNC: 481 MG/DL (ref 200–420)
HCT VFR BLD AUTO: 34.2 % (ref 35–47)
HGB BLD-MCNC: 11.6 G/DL (ref 11.7–15.7)
INR PPP: 0.89 (ref 0.86–1.14)
MCH RBC QN AUTO: 27.7 PG (ref 26.5–33)
MCHC RBC AUTO-ENTMCNC: 33.9 G/DL (ref 31.5–36.5)
MCV RBC AUTO: 82 FL (ref 78–100)
PLATELET # BLD AUTO: 256 10E9/L (ref 150–450)
RBC # BLD AUTO: 4.19 10E12/L (ref 3.8–5.2)
WBC # BLD AUTO: 15.3 10E9/L (ref 4–11)

## 2018-10-26 PROCEDURE — 85027 COMPLETE CBC AUTOMATED: CPT | Performed by: OBSTETRICS & GYNECOLOGY

## 2018-10-26 PROCEDURE — 99213 OFFICE O/P EST LOW 20 MIN: CPT | Performed by: OBSTETRICS & GYNECOLOGY

## 2018-10-26 PROCEDURE — 85730 THROMBOPLASTIN TIME PARTIAL: CPT | Performed by: OBSTETRICS & GYNECOLOGY

## 2018-10-26 PROCEDURE — 85610 PROTHROMBIN TIME: CPT | Performed by: OBSTETRICS & GYNECOLOGY

## 2018-10-26 PROCEDURE — 36415 COLL VENOUS BLD VENIPUNCTURE: CPT | Performed by: OBSTETRICS & GYNECOLOGY

## 2018-10-26 PROCEDURE — 85384 FIBRINOGEN ACTIVITY: CPT | Performed by: OBSTETRICS & GYNECOLOGY

## 2018-10-26 NOTE — PROGRESS NOTES
"CC: 16w6d hit in the abdomen at work by a Mauricio lift.  HPI: Incident occurred @ 0400.  Is having some cramping.  Having very light bleeding--mostly when wiping.     PE: /72 (BP Location: Right arm, Patient Position: Chair, Cuff Size: Adult Large)  Pulse 102  Temp 97.4  F (36.3  C) (Oral)  Resp 18  Ht 5' 1\" (1.549 m)  Wt 238 lb 12.8 oz (108.3 kg)  LMP 2018  Breastfeeding? No  BMI 45.12 kg/m2   See OB flowsheet    Abd: soft, gravid with mild tenderness    Bedside transabdominal ultrasound shows normal fetal cardiac activity and normal fetal movement.  Grossly normal fluid and placenta.     A/P  @ 16w6d normal pregnancy s/p abdominal trauma    1. Advised patient to monitor for increasing pain or bleeding.  Will check baseline abruption labs.  Will plan on formal U/S on Monday with follow up in office to make sure pregnancy is still viable.  Discussed when to return to ED for further follow up and care this weekend.      RTC as scheduled.       Chiara Pleitez M.D.    "

## 2018-10-26 NOTE — NURSING NOTE
"Initial /72 (BP Location: Right arm, Patient Position: Chair, Cuff Size: Adult Large)  Pulse 102  Temp 97.4  F (36.3  C) (Oral)  Resp 18  Ht 5' 1\" (1.549 m)  Wt 238 lb 12.8 oz (108.3 kg)  LMP 06/30/2018  Breastfeeding? No  BMI 45.12 kg/m2 Estimated body mass index is 45.12 kg/(m^2) as calculated from the following:    Height as of this encounter: 5' 1\" (1.549 m).    Weight as of this encounter: 238 lb 12.8 oz (108.3 kg). .    Paige Hoffmann, MANDI    "

## 2018-10-26 NOTE — TELEPHONE ENCOUNTER
Reason for call:  Patient reporting a symptom    Symptom or request: at work she got hit in the belly with a roman lift.  After this has had really really bad cramping -feel like they might be contractions and light bleeding.  17 wks pregnant    Duration (how long have symptoms been present): 4am today    Have you been treated for this before? No    Additional comments:     Phone Number patient can be reached at:  Home number on file [phone] (home)    Best Time:  any    Can we leave a detailed message on this number:  YES    Call taken on 10/26/2018 at 10:53 AM by Jill Leach

## 2018-10-26 NOTE — TELEPHONE ENCOUNTER
Called patient back, instructed to come to clinic.  Unable to reach.  Left message on voice mail for patient to go to clinic instead of ER.    Called ER and spoke with charge RN  Advised her to have patient come to clinic instead of checking in to ER.  Appointment scheduled with on call provider.    Francine Hammer   Ob/Gyn Clinic  RN

## 2018-10-26 NOTE — TELEPHONE ENCOUNTER
Warm transfer call from station .   Patient stated bright red blood immediately following injury with roman lift, now pink and mucus type discharge noted with cramping.     Patient advised to be seen in ED/UC for evaluation. Patient agreeable to plan.   Updated OB/GYN RN as well to discuss with MD on call    Charmaine VILLANUEVA RN   Specialty Clinics

## 2018-10-26 NOTE — MR AVS SNAPSHOT
After Visit Summary   10/26/2018    Cami Ware    MRN: [id number]           Patient Information     Date Of Birth          2000        Visit Information        Provider Department      10/26/2018 11:30 AM Chiara Pleitez MD [facility]        Today's Diagnoses     Encounter for prenatal care in second trimester of first pregnancy    -  1    Abdominal trauma, initial encounter           Follow-ups after your visit        Your next 10 appointments already scheduled     Oct 29, 2018 12:30 PM CDT   US OB >14 WEEKS FOLLOW UP with JENA   [facility] ([facility])    [address] [facility] [address]   [phone]           How do I prepare for my exam? (Food and drink instructions) Drink four 8-ounce glasses of fluid an hour before your exam. If you need to empty your bladder before your exam, try to release only a little urine. Then, drink another glass of fluid.  How do I prepare for my exam? (Other instructions) You may have up to two family members in the exam room. If you bring a small child, an adult must be there to care for him or her. No video or camera photography during the procedure.  What should I wear: Wear comfortable clothes.  How long does the exam take: Most ultrasounds take 30 to 60 minutes.  What should I bring: Bring a list of your medicines, including vitamins, minerals and over-the-counter drugs. It is safest to leave personal items at home.  Do I need a :  No  is needed.  What do I need to tell my doctor: Tell your doctor about any allergies you may have.  What should I do after the exam: No restrictions, You may resume normal activities.  What is this test: An ultrasound uses sound waves to make pictures of the body. Sound waves do not cause pain. The only discomfort may be the pressure of the wand against your skin or full bladder.  Who should I call with questions: If you have any questions, please  call the Imaging Department where you will have your exam. Directions, parking instructions, and other information is available on our website, [email or web address].            Nov 19, 2018  8:00 AM CST   ESTABLISHED PRENATAL with Zaria Kramer MD   [facility] ([facility])    [address] [facility]  [facility] [address]   [phone]              Future tests that were ordered for you today     Open Future Orders        Priority Expected Expires Ordered    US OB >14 Weeks Follow Up Routine  10/26/2019 10/26/2018            Who to contact     If you have questions or need follow up information about today's clinic visit or your schedule please contact [facility] directly at [phone].  Normal or non-critical lab and imaging results will be communicated to you by MyChart, letter or phone within 4 business days after the clinic has received the results. If you do not hear from us within 7 days, please contact the clinic through MyChart or phone. If you have a critical or abnormal lab result, we will notify you by phone as soon as possible.  Submit refill requests through Prez[employer]or or call your pharmacy and they will forward the refill request to us. Please allow 3 business days for your refill to be completed.          Additional Information About Your Visit        JFroghart Information     [employer] gives you secure access to your electronic health record. If you see a primary care provider, you can also send messages to your care team and make appointments. If you have questions, please call your primary care clinic.  If you do not have a primary care provider, please call [phone] and they will assist you.        Care EveryWhere ID     This is your Care EveryWhere ID. This could be used by other organizations to access your [facility] medical records  [id number]        Your Vitals Were     Pulse Temperature Respirations Height Last Period Breastfeeding?     "102 97.4  F (36.3  C) (Oral) 18 5' 1\" (1.549 m) 06/30/2018 No    BMI (Body Mass Index)                   45.12 kg/m2            Blood Pressure from Last 3 Encounters:   10/26/18 121/72   10/23/18 120/73   09/18/18 124/75    Weight from Last 3 Encounters:   10/26/18 238 lb 12.8 oz (108.3 kg) (>99 %)*   10/23/18 238 lb (108 kg) (>99 %)*   09/18/18 229 lb (103.9 kg) (99 %)*     * Growth percentiles are based on [facility] 2-20 Years data.              We Performed the Following     CBC with platelets     Fibrinogen activity     INR     Partial thromboplastin time        Primary Care Provider Office Phone # Fax #    Jihan Pinto Renzo Jacobsen, JESÚS [facility] [phone] [address] [facility] [address]        Equal Access to Services     YOVANY CH : Hadii asha ku hadasho Soomaali, waaxda luqadaha, qaybta kaalmada adeegyada, waxsascha hughesin hayvishal wagner . So [facility] [phone].    ATENCIÓN: Si sophie abbott, tiene a mcgee disposición servicios gratuitos de asistencia lingüística. Llame al [phone].    We comply with applicable federal civil rights laws and [place] laws. We do not discriminate on the basis of race, color, national origin, age, disability, sex, sexual orientation, or gender identity.            Thank you!     Thank you for choosing [facility]  for your care. Our goal is always to provide you with excellent care. Hearing back from our patients is one way we can continue to improve our services. Please take a few minutes to complete the written survey that you may receive in the mail after your visit with us. Thank you!             Your Updated Medication List - Protect others around you: Learn how to safely use, store and throw away your medicines at www.disposemymeds.org.          This list is accurate as of 10/26/18 11:50 AM.  Always use your most recent med list.                   Brand Name Dispense Instructions for use Diagnosis    albuterol 108 (90 Base) " MCG/ACT inhaler    PROAIR HFA/PROVENTIL HFA/VENTOLIN HFA     Inhale 2 puffs into the lungs every 6 hours as needed for shortness of breath / dyspnea or wheezing        prenatal multivitamin plus iron 27-0.8 MG Tabs per tablet      Take 1 tablet by mouth daily        SUMAtriptan 100 MG tablet    IMITREX     Take 100 mg by mouth at onset of headache for migraine TK 1 T PO AT ONSET OF HEADACHE FOR MIGRAINE. MAY REPEAT IN 2 H. MAX 2 TS IN 24 H

## 2018-10-29 ENCOUNTER — HOSPITAL ENCOUNTER (OUTPATIENT)
Dept: ULTRASOUND IMAGING | Facility: CLINIC | Age: 18
Discharge: HOME | End: 2018-10-29
Attending: OBSTETRICS & GYNECOLOGY | Admitting: OBSTETRICS & GYNECOLOGY
Payer: COMMERCIAL

## 2018-10-29 ENCOUNTER — PRENATAL OFFICE VISIT (OUTPATIENT)
Dept: OBGYN | Facility: CLINIC | Age: 18
End: 2018-10-29
Payer: COMMERCIAL

## 2018-10-29 VITALS
WEIGHT: 239 LBS | HEART RATE: 96 BPM | DIASTOLIC BLOOD PRESSURE: 76 MMHG | BODY MASS INDEX: 45.16 KG/M2 | SYSTOLIC BLOOD PRESSURE: 108 MMHG | TEMPERATURE: 98 F

## 2018-10-29 DIAGNOSIS — Z34.02 ENCOUNTER FOR PRENATAL CARE IN SECOND TRIMESTER OF FIRST PREGNANCY: Primary | ICD-10-CM

## 2018-10-29 DIAGNOSIS — S39.91XA ABDOMINAL TRAUMA, INITIAL ENCOUNTER: ICD-10-CM

## 2018-10-29 DIAGNOSIS — Z34.02 ENCOUNTER FOR PRENATAL CARE IN SECOND TRIMESTER OF FIRST PREGNANCY: ICD-10-CM

## 2018-10-29 PROCEDURE — 76815 OB US LIMITED FETUS(S): CPT

## 2018-10-29 PROCEDURE — 99207 ZZC PRENATAL VISIT: CPT | Performed by: OBSTETRICS & GYNECOLOGY

## 2018-10-29 NOTE — LETTER
[facility]  [address] [facility] [address]  [phone]          October 29, 2018    RE:  Cami Ware                                                                                                                                                       [address]   [facility] [address]            To whom it may concern:    Cami Ware is under my professional care.  She  may return to work with the following: The employee is ABLE to return to work today.    When the patient returns to work, the following restrictions apply until November 19,2018      A:  Not to operate or be near a roman lift that is being used.    B:  No lifting at all until date above.      Sincerely,         Zaria Kramer MD

## 2018-10-29 NOTE — MR AVS SNAPSHOT
After Visit Summary   10/29/2018    Cami Ware    MRN: [id number]           Patient Information     Date Of Birth          2000        Visit Information        Provider Department      10/29/2018 1:30 PM Zaria Kramer MD [facility]        Today's Diagnoses     Encounter for prenatal care in second trimester of first pregnancy    -  1       Follow-ups after your visit        Your next 10 appointments already scheduled     Nov 19, 2018  8:00 AM CST   ESTABLISHED PRENATAL with Zaria Kramer MD   [facility] ([facility])    [address] [facility] [address]   [phone]              Future tests that were ordered for you today     Open Future Orders        Priority Expected Expires Ordered    US OB >14 Weeks Limited wo Fetal Measurement Routine  10/26/2019 10/26/2018            Who to contact     If you have questions or need follow up information about today's clinic visit or your schedule please contact [facility] directly at [phone].  Normal or non-critical lab and imaging results will be communicated to you by Cosyforyouhart, letter or phone within 4 business days after the clinic has received the results. If you do not hear from us within 7 days, please contact the clinic through Cosyforyouhart or phone. If you have a critical or abnormal lab result, we will notify you by phone as soon as possible.  Submit refill requests through Easy [employer]i or call your pharmacy and they will forward the refill request to us. Please allow 3 business days for your refill to be completed.          Additional Information About Your Visit        Cosyforyouhart Information     [employer] gives you secure access to your electronic health record. If you see a primary care provider, you can also send messages to your care team and make appointments. If you have questions, please call your primary care clinic.  If you do not have a primary  care provider, please call [phone] and they will assist you.        Care EveryWhere ID     This is your Care EveryWhere ID. This could be used by other organizations to access your [facility] medical records  [id number]        Your Vitals Were     Pulse Temperature Last Period Breastfeeding? BMI (Body Mass Index)       96 98  F (36.7  C) (Tympanic) 06/30/2018 No 45.16 kg/m2        Blood Pressure from Last 3 Encounters:   10/29/18 108/76   10/26/18 121/72   10/23/18 120/73    Weight from Last 3 Encounters:   10/29/18 239 lb (108.4 kg) (>99 %)*   10/26/18 238 lb 12.8 oz (108.3 kg) (>99 %)*   10/23/18 238 lb (108 kg) (>99 %)*     * Growth percentiles are based on [facility] 2-20 Years data.              Today, you had the following     No orders found for display       Primary Care Provider Office Phone # Fax #    Jihan Pinto Renzo Jacobsen, APRN [facility] [phone] [address] [facility] [address]        Equal Access to Services     KIZZY CH : Hadii aad ku hadasho Sodaneali, waaxda luqadaha, qaybta kaalmada adeegyada, aneudy wagner . So [facility] [phone].    ATENCIÓN: Si habla español, tiene a mcgee disposición servicios gratuitos de asistencia lingüística. Llame al [phone].    We comply with applicable federal civil rights laws and [place] laws. We do not discriminate on the basis of race, color, national origin, age, disability, sex, sexual orientation, or gender identity.            Thank you!     Thank you for choosing [facility]  for your care. Our goal is always to provide you with excellent care. Hearing back from our patients is one way we can continue to improve our services. Please take a few minutes to complete the written survey that you may receive in the mail after your visit with us. Thank you!             Your Updated Medication List - Protect others around you: Learn how to safely use, store and throw away your medicines at  www.disposemymeds.org.          This list is accurate as of 10/29/18  2:32 PM.  Always use your most recent med list.                   Brand Name Dispense Instructions for use Diagnosis    albuterol 108 (90 Base) MCG/ACT inhaler    PROAIR HFA/PROVENTIL HFA/VENTOLIN HFA     Inhale 2 puffs into the lungs every 6 hours as needed for shortness of breath / dyspnea or wheezing        prenatal multivitamin plus iron 27-0.8 MG Tabs per tablet      Take 1 tablet by mouth daily        SUMAtriptan 100 MG tablet    IMITREX     Take 100 mg by mouth at onset of headache for migraine TK 1 T PO AT ONSET OF HEADACHE FOR MIGRAINE. MAY REPEAT IN 2 H. MAX 2 TS IN 24 H

## 2018-10-29 NOTE — PROGRESS NOTES
Doing well.   Still has abdominal cramping and soreness from abdominal trauma. Reports light pink discharge with wiping which has been a matthews improvement from the darker red discharge she had with wiping following trauma.   Does feel fetal movements.   Returned from ultrasound clinic this AM.   /76 (BP Location: Left arm, Patient Position: Chair, Cuff Size: Adult Large)  Pulse 96  Temp 98  F (36.7  C) (Tympanic)  Wt 239 lb (108.4 kg)  LMP 2018  Breastfeeding? No  BMI 45.16 kg/m2  General Appearance: NAD  Abdomen: Gravid, NT  Refer to flow sheet above.   A/P: 17 year old  at 17w2d  -- reviewed normal fetal ultrasound confirming viability  -- letter provided to employer for restrictions against heavy lifting and avoidance of roman lift use  -- SAB precautions reviewed  -- encouraged to schedule her fetal anatomy ultrasound   RTC in 4 weeks     Zaria Kramer MD  [facility]

## 2018-10-29 NOTE — NURSING NOTE
"Initial /76 (BP Location: Left arm, Patient Position: Chair, Cuff Size: Adult Large)  Pulse 96  Temp 98  F (36.7  C) (Tympanic)  Wt 239 lb (108.4 kg)  LMP 06/30/2018  Breastfeeding? No  BMI 45.16 kg/m2 Estimated body mass index is 45.16 kg/(m^2) as calculated from the following:    Height as of 10/26/18: 5' 1\" (1.549 m).    Weight as of this encounter: 239 lb (108.4 kg). .    China Marks    "

## 2018-10-29 NOTE — LETTER
[facility]  [address] [facility] [address]  [phone]          October 29, 2018    RE:  Cami Ware                                                                                                                                                       [address]   [facility] [address]            To whom it may concern:    Cami Ware is under my professional care for Data Unavailable She  may return to work with the following: The employee is ABLE to return to work today.    When the patient returns to work, the following restrictions apply until November 19, 2018.    Patient is not to be near or operate a roman lift for the next 3 weeks.        Sincerely,        Zaria Kramer MD

## 2018-11-16 ENCOUNTER — HOSPITAL ENCOUNTER (OUTPATIENT)
Dept: ULTRASOUND IMAGING | Facility: CLINIC | Age: 18
Discharge: HOME | End: 2018-11-16
Attending: OBSTETRICS & GYNECOLOGY | Admitting: OBSTETRICS & GYNECOLOGY
Payer: COMMERCIAL

## 2018-11-16 DIAGNOSIS — Z34.02 ENCOUNTER FOR PRENATAL CARE IN SECOND TRIMESTER OF FIRST PREGNANCY: ICD-10-CM

## 2018-11-16 PROCEDURE — 76805 OB US >/= 14 WKS SNGL FETUS: CPT

## 2018-11-20 ENCOUNTER — PRENATAL OFFICE VISIT (OUTPATIENT)
Dept: OBGYN | Facility: CLINIC | Age: 18
End: 2018-11-20
Payer: COMMERCIAL

## 2018-11-20 ENCOUNTER — TELEPHONE (OUTPATIENT)
Dept: OBGYN | Facility: CLINIC | Age: 18
End: 2018-11-20

## 2018-11-20 VITALS
SYSTOLIC BLOOD PRESSURE: 132 MMHG | WEIGHT: 249.8 LBS | BODY MASS INDEX: 47.16 KG/M2 | HEART RATE: 93 BPM | HEIGHT: 61 IN | TEMPERATURE: 98.2 F | RESPIRATION RATE: 20 BRPM | DIASTOLIC BLOOD PRESSURE: 80 MMHG

## 2018-11-20 DIAGNOSIS — M54.42 ACUTE LEFT-SIDED LOW BACK PAIN WITH LEFT-SIDED SCIATICA: ICD-10-CM

## 2018-11-20 DIAGNOSIS — Z34.02 ENCOUNTER FOR SUPERVISION OF NORMAL FIRST PREGNANCY IN SECOND TRIMESTER: ICD-10-CM

## 2018-11-20 DIAGNOSIS — Z34.02 ENCOUNTER FOR PRENATAL CARE IN SECOND TRIMESTER OF FIRST PREGNANCY: Primary | ICD-10-CM

## 2018-11-20 DIAGNOSIS — K21.9 GASTROESOPHAGEAL REFLUX DISEASE WITHOUT ESOPHAGITIS: ICD-10-CM

## 2018-11-20 DIAGNOSIS — K21.9 GASTROESOPHAGEAL REFLUX DISEASE WITHOUT ESOPHAGITIS: Primary | ICD-10-CM

## 2018-11-20 PROCEDURE — 99207 ZZC PRENATAL VISIT: CPT | Performed by: OBSTETRICS & GYNECOLOGY

## 2018-11-20 RX ORDER — RABEPRAZOLE SODIUM 20 MG/1
20 TABLET, DELAYED RELEASE ORAL DAILY
Qty: 30 TABLET | Refills: 1 | Status: SHIPPED | OUTPATIENT
Start: 2018-11-20

## 2018-11-20 NOTE — PROGRESS NOTES
Concerns today: reviewed the ULTRASOUND films   No vaginal bleeding, no contractions/severe cramping, no leakage of fluid.  No vaginal discharge. No dysuria  No headache, vision changes, lower extremity swelling, upper abdominal pain, chest pain, shortness of breath.   Reportable signs and symptoms discussed.  Having heartburn - has tries chewable antacid without effect  Reviewed ULTRASOUND films - all appears to be normal  Low Back pain- Pregnancy exercises given - referral to Chiropracter  Aciphex ordered vs GERD      Ziggy Cao MD

## 2018-11-20 NOTE — MR AVS SNAPSHOT
After Visit Summary   11/20/2018    Cami Ware    MRN: [id number]           Patient Information     Date Of Birth          2000        Visit Information        Provider Department      11/20/2018 10:15 AM Ziggy Cao MD [facility]        Today's Diagnoses     Encounter for prenatal care in second trimester of first pregnancy    -  1    Gastroesophageal reflux disease without esophagitis        Acute left-sided low back pain with left-sided sciatica           Follow-ups after your visit        Additional Services     CHIROPRACTIC REFERRAL       Your provider has referred you to: Chiropracter      Please be aware that coverage of these services is subject to the terms and limitations of your health insurance plan.  Call member services at your health plan with any benefit or coverage questions.      Please bring the following to your appointment:    >>   Any x-rays, CTs or MRIs which have been performed.  Contact the facility where they were done to arrange for  prior to your scheduled appointment.    >>   List of current medications   >>   This referral request   >>   Any documents/labs given to you for this referral                  Future tests that were ordered for you today     Open Future Orders        Priority Expected Expires Ordered    Glucose tolerance gest screen 1 hour Routine  1/1/2019 11/20/2018    CBC with platelets Routine  1/1/2019 11/20/2018    Treponema Abs w Reflex to RPR and Titer Routine  1/1/2019 11/20/2018            Who to contact     If you have questions or need follow up information about today's clinic visit or your schedule please contact [facility] directly at [phone].  Normal or non-critical lab and imaging results will be communicated to you by MyChart, letter or phone within 4 business days after the clinic has received the results. If you do not hear from us within 7 days, please contact the clinic  "through [employer] or phone. If you have a critical or abnormal lab result, we will notify you by phone as soon as possible.  Submit refill requests through [employer] or call your pharmacy and they will forward the refill request to us. Please allow 3 business days for your refill to be completed.          Additional Information About Your Visit        [employer] Information     [employer] gives you secure access to your electronic health record. If you see a primary care provider, you can also send messages to your care team and make appointments. If you have questions, please call your primary care clinic.  If you do not have a primary care provider, please call [phone] and they will assist you.        Care EveryWhere ID     This is your Care EveryWhere ID. This could be used by other organizations to access your [facility] medical records  [id number]        Your Vitals Were     Pulse Temperature Respirations Height Last Period BMI (Body Mass Index)    93 98.2  F (36.8  C) 20 5' 1\" (1.549 m) 06/30/2018 47.2 kg/m2       Blood Pressure from Last 3 Encounters:   11/20/18 132/80   10/29/18 108/76   10/26/18 121/72    Weight from Last 3 Encounters:   11/20/18 249 lb 12.8 oz (113.3 kg) (>99 %)*   10/29/18 239 lb (108.4 kg) (>99 %)*   10/26/18 238 lb 12.8 oz (108.3 kg) (>99 %)*     * Growth percentiles are based on [facility] 2-20 Years data.              We Performed the Following     CHIROPRACTIC REFERRAL          Today's Medication Changes          These changes are accurate as of 11/20/18 10:49 AM.  If you have any questions, ask your nurse or doctor.               Start taking these medicines.        Dose/Directions    RABEprazole 20 MG EC tablet   Commonly known as:  ACIPHEX   Used for:  Encounter for prenatal care in second trimester of first pregnancy, Gastroesophageal reflux disease without esophagitis   Started by:  Ziggy Cao MD        Dose:  20 mg   Take 1 tablet (20 mg) by mouth daily   Quantity:  30 tablet "   Refills:  1            Where to get your medicines      These medications were sent to [facility] - [place], [place] - [address] AT [facility] [address]  [address] [address]     Phone:  [phone]     RABEprazole 20 MG EC tablet                Primary Care Provider Office Phone # Fax #    Jihan Jacobsen, APRN [facility] [phone] [address] [facility] [address]        Equal Access to Services     KIZZY CH : Hadii aad ku hadasho Soomaali, waaxda luqadaha, qaybta kaalmada adeegyada, waxay ellenin hayaan shi wagner . So [facility] [phone].    ATENCIÓN: Si habla español, tiene a mcgee disposición servicios gratuitos de asistencia lingüística. [facility] [phone].    We comply with applicable federal civil rights laws and [place] laws. We do not discriminate on the basis of race, color, national origin, age, disability, sex, sexual orientation, or gender identity.            Thank you!     Thank you for choosing [facility]  for your care. Our goal is always to provide you with excellent care. Hearing back from our patients is one way we can continue to improve our services. Please take a few minutes to complete the written survey that you may receive in the mail after your visit with us. Thank you!             Your Updated Medication List - Protect others around you: Learn how to safely use, store and throw away your medicines at www.disposemymeds.org.          This list is accurate as of 11/20/18 10:49 AM.  Always use your most recent med list.                   Brand Name Dispense Instructions for use Diagnosis    albuterol 108 (90 Base) MCG/ACT inhaler    PROAIR HFA/PROVENTIL HFA/VENTOLIN HFA     Inhale 2 puffs into the lungs every 6 hours as needed for shortness of breath / dyspnea or wheezing        prenatal multivitamin plus iron 27-0.8 MG Tabs per tablet      Take 1 tablet by mouth daily         RABEprazole 20 MG EC tablet    ACIPHEX    30 tablet    Take 1 tablet (20 mg) by mouth daily    Encounter for prenatal care in second trimester of first pregnancy, Gastroesophageal reflux disease without esophagitis       SUMAtriptan 100 MG tablet    IMITREX     Take 100 mg by mouth at onset of headache for migraine TK 1 T PO AT ONSET OF HEADACHE FOR MIGRAINE. MAY REPEAT IN 2 H. MAX 2 TS IN 24 H

## 2018-11-20 NOTE — TELEPHONE ENCOUNTER
Reason for Call:  Other prescription    Detailed comments: Rabeprazole DR 20mg tabs are not covered by pt's insurance.  Pt's insurance prefers Lansoprazole, Omeprazole, Pantoprazolesodium.  Will send to provider to advise.    Phone Number Patient can be reached at: Insurance [phone]  ID [id number]  Pharmacy [facility] [phone]    Best Time: any    Can we leave a detailed message on this number? YES    Call taken on 11/20/2018 at 3:55 PM by Jill Leach

## 2018-11-20 NOTE — LETTER
[facility]  [address] [facility] [address]  [phone]        January 7, 2019    Cami Ware  [address] [place] [address]   [facility] [address]              Dear Cami Ware    This is to remind you that your fasting lab is due.    You may call our office at [phone] to schedule an appointment.    Please disregard this notice if you have already had your labs drawn or made an appointment.        Sincerely,        Zgigy Cao MD

## 2018-11-21 RX ORDER — OMEPRAZOLE 40 MG/1
40 CAPSULE, DELAYED RELEASE ORAL DAILY
Qty: 30 CAPSULE | Refills: 2 | Status: SHIPPED | OUTPATIENT
Start: 2018-11-21

## 2018-11-21 NOTE — TELEPHONE ENCOUNTER
Omeprazole 40mg po daily   1 month with 2 refills   Thank you     Ziggy Cao     Prescription generated to the pharmacy.    Francine Hammer   [facility] Clinic  [place]

## 2018-11-26 NOTE — PROGRESS NOTES
Normal fetal anatomy ultrasound. Will review at next follow-up visit.     Zaria Kramer MD  [facility]

## 2019-01-30 DIAGNOSIS — K59.00 CONSTIPATION, UNSPECIFIED CONSTIPATION TYPE: ICD-10-CM

## 2019-01-31 NOTE — TELEPHONE ENCOUNTER
Left message for patient to return call to clinic. Is patient still taking this?    Order Status Reason By On   Discontinued Therapy completed Pamela Sanches, MANDI 10/27/17 6626      ARI BenítezN, RN

## 2019-02-01 NOTE — TELEPHONE ENCOUNTER
Patient is currently 30 w 6d pregnant.   Patient is overdue for OB appointment, last OB visit 11/20/18.  Attempted to contact patient, no answer, left voice message to call back.

## 2019-02-05 RX ORDER — DOCUSATE SODIUM 100 MG/1
CAPSULE, LIQUID FILLED ORAL
Qty: 60 CAPSULE | Refills: 0 | Status: SHIPPED | OUTPATIENT
Start: 2019-02-05

## 2019-11-05 ENCOUNTER — HOSPITAL ENCOUNTER (EMERGENCY)
Facility: CLINIC | Age: 19
Discharge: LEFT BEFORE DISCHARGE | End: 2019-11-05
Payer: COMMERCIAL

## 2019-11-05 VITALS
HEART RATE: 92 BPM | DIASTOLIC BLOOD PRESSURE: 88 MMHG | TEMPERATURE: 97.2 F | RESPIRATION RATE: 16 BRPM | SYSTOLIC BLOOD PRESSURE: 117 MMHG | OXYGEN SATURATION: 98 % | BODY MASS INDEX: 47.2 KG/M2 | HEIGHT: 61 IN

## 2020-03-02 ENCOUNTER — HEALTH MAINTENANCE LETTER (OUTPATIENT)
Age: 20
End: 2020-03-02

## 2020-11-26 ENCOUNTER — VIRTUAL VISIT (OUTPATIENT)
Dept: FAMILY MEDICINE | Facility: OTHER | Age: 20
End: 2020-11-26

## 2020-11-26 NOTE — PROGRESS NOTES
"Date: 2020 08:25:42  Clinician: Nereyda Brown  Clinician NPI: [id number]  Patient: Cami Ware  Patient : 2000  Patient Address: [address] [place] [address] [address], [place] [address]  Patient Phone: [phone]  Visit Protocol: URI  Patient Summary:  Cami is a 19 year old ( : 2000 ) female who initiated a [facility] Visit for COVID-19 (Coronavirus) evaluation and screening. When asked the question \"Please sign me up to receive news, health information and promotions from [facility].\", Cami responded \"No\".    Cami states her symptoms started gradually 3-4 days ago.   Her symptoms consist of a headache, a cough, nasal congestion, myalgia, chills, malaise, a sore throat, ageusia, diarrhea, and anosmia. She is experiencing mild difficulty breathing with activities but can speak normally in full sentences. Cami also feels feverish.   Symptom details     Nasal secretions: The color of her mucus is white.    Cough: Cami coughs a few times an hour and her cough is more bothersome at night. Phlegm does not come into her throat when she coughs. She does not believe her cough is caused by post-nasal drip.     Sore throat: Cami reports having moderate throat pain (4-6 on a 10 point pain scale), does not have exudate on her tonsils, and can swallow liquids. The lymph nodes in her neck are not enlarged. A rash has not appeared on the skin since the sore throat started.     Temperature: Her current temperature is 101.1 degrees Fahrenheit. Cami has had a temperature over 100 degrees Fahrenheit for 1-2 days.     Headache: She states the headache is severe (7-9 on a 10 point pain scale).      Cami denies having ear pain, wheezing, enlarged lymph nodes, nausea, vomiting, rhinitis, facial pain or pressure, and teeth pain. She also denies taking antibiotic medication in the past month, having recent facial or sinus surgery in the past 60 days, double sickening (worsening symptoms after initial " improvement), and having a sinus infection within the past year.   Precipitating events  Within the past week, Cami has not been exposed to someone with strep throat. She has not recently been exposed to someone with influenza. Cami has been in close contact with the following high risk individuals: children under the age of 5, people with asthma, heart disease or diabetes, immunocompromised people, and adults 65 or older.   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   Cami has had a close contact with a laboratory-confirmed COVID-19 patient within 14 days of symptom onset. She was exposed at her work. Date Cami was exposed to the laboratory-confirmed COVID-19 patient: 11/18/2020   Additional information about contact with COVID-19 (Coronavirus) patient as reported by the patient (free text): At work from my boss and and a resident    Since December 2019, Cami has been tested for COVID-19 and has not had upper respiratory infection or influenza-like illness.      Result of COVID-19 test: Negative     Date of her COVID-19 test: 10/14/2020      Pertinent medical history  Cami has asthma. She uses quick-relief inhaler less than two times per week. She refills her quick-relief inhaler less than two times per year. She wakes up at night with asthma symptoms less than two times per month.   She has not been told by her provider to avoid NSAIDs.   Cami does not get yeast infections when she takes antibiotics.   Cami does not have diabetes. She denies having immunosuppressive conditions (e.g., chemotherapy, HIV, organ transplant, long-term  use of steroids or other immunosuppressive medications, splenectomy). She does not have severe COPD and congestive heart failure.   Cami needs a return to work/school note.   Weight: 260 lbs   Cami smokes or uses smokeless tobacco.   She denies pregnancy and denies breastfeeding. She does not menstruate.   Weight: 260 lbs    MEDICATIONS: Mirena intrauterine, sumatriptan-naproxen oral, Topamax oral, ALLERGIES: NKDA  Clinician Response:  Dear Cami,  Your health is our priority. Based on the information you have provided, it is possible that you may have some type of viral infection.  Please read the full treatment plan and see my recommendations below.  Medication information  Because you have a viral infection, antibiotics will not help you get better. Treating a viral infection with antibiotics could actually make you feel worse.  Unless you are allergic to the over-the-counter medication(s) below, I recommend using:       Acetaminophen (Tylenol or store brand) oral tablet. Take 1-2 tablets by mouth every 4-6 hours to help with the discomfort.    A decongestant such as Sudafed PE or store brand.     Over-the-counter medications do not require a prescription. Ask the pharmacist if you have any questions.  Self care  Steps you can take to be as comfortable as possible:     Rest.    Drink plenty of fluids.    Take a warm shower to loosen congestion    Use a cool-mist humidifier.    Use throat lozenges.    Suck on frozen items such as popsicles.    Drink hot tea with lemon and honey.    Gargle with warm salt water (1/4 teaspoon of salt per 8 ounce glass of water).    Take a spoonful of honey to reduce your cough.     Also, as your provider, I need you to know that becoming tobacco-free is the most important thing you can do to protect your current and future health.  Additional treatment plan   Your symptoms show that you may have coronavirus (COVID-19). This illness can cause fever, cough and trouble  "breathing. Many people get a mild case and get better on their own. Some people can get very sick.  Based on the symptoms you have shared, I would like you to be re-checked in 2 to 3 days. Please call your family clinic to set up a video or phone visit.  Will I be tested for COVID-19?  We would like to test you for this virus.   Please call [phone] to schedule your visit. Explain that you were referred by [facility] to have a COVID-19 test. Be ready to share your [facility] visit ID number.   * If you need to schedule in [place] or [facility] please call [phone] or for [employer] employees please call [phone].    The following will serve as your written order for this COVID Test, ordered by me, for the indication of suspected COVID [Z20.828]: The test will be ordered in [facility], our electronic health record, after you are scheduled. It will show as ordered and authorized by Donnie Pinto MD.  Order: COVID-19 (Coronavirus) PCR for SYMPTOMATIC testing from [facility].   1.When it's time for your COVID test:   Stay at least 6 feet away from others. (If someone will drive you to your test, stay in the backseat, as far away from the  as you can.)   Cover your mouth and nose with a mask, tissue or washcloth.  Go straight to the testing site. Don't make any stops on the way there or back.      2.Starting now: Stay home and away from others (self-isolate) until:   You've had no fever---and no medicine that reduces fever---for one full day (24 hours). And...   Your other symptoms have gotten better. For example, your cough or breathing has improved. And...   At least 10 days have passed since your symptoms started.       During this time, don't leave the house except for testing or medical care.   Stay in your own room, even for meals. Use your own bathroom if you can.   Stay away from others in your home. No hugging, kissing or shaking hands. No visitors.  Don't go to work, school or anywhere else.    Clean \"high " "touch\" surfaces often (doorknobs, counters, handles, etc.). Use a household cleaning spray or wipes. You'll find a full list of  on the EPA website: www.epa.gov/pesticide-registration/list-n-disinfectants-use-against-sars-cov-2.   Cover your mouth and nose with a mask, tissue or washcloth to avoid spreading germs.  Wash your hands and face often. Use soap and water.  Caregivers in these groups are at risk for severe illness due to COVID-19:  o People 65 years and older  o People who live in a nursing home or long-term care facility  o People with chronic disease (lung, heart, cancer, diabetes, kidney, liver, immunologic)   o People who have a weakened immune system, including those who:   Are in cancer treatment  Take medicine that weakens the immune system, such as corticosteroids  Had a bone marrow or organ transplant  Have an immune deficiency  Have poorly controlled HIV or AIDS  Are obese (body mass index of 40 or higher)  Smoke regularly   o Caregivers should wear gloves while washing dishes, handling laundry and cleaning bedrooms and bathrooms.  o Use caution when washing and drying laundry: Don't shake dirty laundry, and use the warmest water setting that you can.  o For more tips, go to www.cdc.gov/coronavirus/2019-ncov/downloads/10Things.pdf.      How can I take care of myself?   Get lots of rest. Drink extra fluids (unless a doctor has told you not to)   Take Tylenol (acetaminophen) for fever or pain. If you have liver or kidney problems, ask your family doctor if it's okay to take Tylenol.   Adults can take either:    650 mg (two 325 mg pills) every 4 to 6 hours, or...   1,000 mg (two 500 mg pills) every 8 hours as needed.    Note: Don't take more than 3,000 mg in one day. Acetaminophen is found in many medicines (both prescribed and over-the-counter medicines). Read all labels to be sure you don't take too much.   For children, check the Tylenol bottle for the right dose. The dose is based on the " child's age or weight.    If you have other health problems (like cancer, heart failure, an organ transplant or severe kidney disease): Call your specialty clinic if you don't feel better in the next 2 days.       Know when to call 911. Emergency warning signs include:    Trouble breathing or shortness of breath Pain or pressure in the chest that doesn't go away Feeling confused like you haven't felt before, or not being able to wake up Bluish-colored lips or face  Where can I get more information?   [facility] -- About COVID-19: [email or web address]   CDC -- What to Do If You're Sick: www.cdc.gov/coronavirus/2019-ncov/about/steps-when-sick.html   CDC -- Ending Home Isolation: www.cdc.gov/coronavirus/2019-ncov/hcp/disposition-in-home-patients.html   [facility] -- Caring for Someone: www.cdc.gov/coronavirus/2019-ncov/if-you-are-sick/care-for-someone.html   [facility] -- Interim Guidance for Hospital Discharge to Home: www.[facility].[place]/diseases/coronavirus/hcp/hospdischarge.pdf   [facility] clinical trials (COVID-19 research studies): [email or web address]-clinical-trials    Below are the COVID-19 hotlines at the [place] Department [facility] ([facility]). Interpreters are available.    For health questions: Call 938-056-0552 or 1-571.557.3155 (7 a.m. to 7 p.m.) For questions about schools and childcare: Call 003-345-5584 or 1-960.288.9422 (7 a.m. to 7 p.m.)       Diagnosis: Cough  Diagnosis ICD: R05

## 2020-11-27 DIAGNOSIS — Z20.822 SUSPECTED COVID-19 VIRUS INFECTION: Primary | ICD-10-CM

## 2020-11-27 DIAGNOSIS — Z20.822 SUSPECTED COVID-19 VIRUS INFECTION: ICD-10-CM

## 2020-11-27 DIAGNOSIS — Z20.822 SUSPECTED 2019 NOVEL CORONAVIRUS INFECTION: Primary | ICD-10-CM

## 2020-11-27 PROCEDURE — U0003 INFECTIOUS AGENT DETECTION BY NUCLEIC ACID (DNA OR RNA); SEVERE ACUTE RESPIRATORY SYNDROME CORONAVIRUS 2 (SARS-COV-2) (CORONAVIRUS DISEASE [COVID-19]), AMPLIFIED PROBE TECHNIQUE, MAKING USE OF HIGH THROUGHPUT TECHNOLOGIES AS DESCRIBED BY CMS-2020-01-R: HCPCS | Performed by: NURSE PRACTITIONER

## 2020-11-29 LAB
SARS-COV-2 RNA SPEC QL NAA+PROBE: NOT DETECTED
SPECIMEN SOURCE: NORMAL

## 2020-12-20 ENCOUNTER — HEALTH MAINTENANCE LETTER (OUTPATIENT)
Age: 20
End: 2020-12-20

## 2021-04-24 ENCOUNTER — HEALTH MAINTENANCE LETTER (OUTPATIENT)
Age: 21
End: 2021-04-24

## 2021-10-03 ENCOUNTER — HEALTH MAINTENANCE LETTER (OUTPATIENT)
Age: 21
End: 2021-10-03

## 2022-05-15 ENCOUNTER — HEALTH MAINTENANCE LETTER (OUTPATIENT)
Age: 22
End: 2022-05-15

## 2022-09-10 ENCOUNTER — HEALTH MAINTENANCE LETTER (OUTPATIENT)
Age: 22
End: 2022-09-10

## 2023-06-03 ENCOUNTER — HEALTH MAINTENANCE LETTER (OUTPATIENT)
Age: 23
End: 2023-06-03